# Patient Record
Sex: FEMALE | Race: BLACK OR AFRICAN AMERICAN | URBAN - METROPOLITAN AREA
[De-identification: names, ages, dates, MRNs, and addresses within clinical notes are randomized per-mention and may not be internally consistent; named-entity substitution may affect disease eponyms.]

---

## 2020-06-01 ENCOUNTER — OFFICE VISIT (OUTPATIENT)
Dept: URBAN - METROPOLITAN AREA SURGERY CENTER 16 | Facility: SURGERY CENTER | Age: 40
End: 2020-06-01

## 2020-06-19 ENCOUNTER — OFFICE VISIT (OUTPATIENT)
Dept: URBAN - METROPOLITAN AREA CLINIC 91 | Facility: CLINIC | Age: 40
End: 2020-06-19
Payer: COMMERCIAL

## 2020-06-19 DIAGNOSIS — K51.80 OTHER ULCERATIVE COLITIS WITHOUT COMPLICATION: ICD-10-CM

## 2020-06-19 PROCEDURE — 96415 CHEMO IV INFUSION ADDL HR: CPT | Performed by: INTERNAL MEDICINE

## 2020-06-19 PROCEDURE — 96413 CHEMO IV INFUSION 1 HR: CPT | Performed by: INTERNAL MEDICINE

## 2020-06-19 PROCEDURE — 96375 TX/PRO/DX INJ NEW DRUG ADDON: CPT | Performed by: INTERNAL MEDICINE

## 2020-06-19 RX ORDER — GABAPENTIN 300 MG/1
TAKE 1 CAPSULE BY ORAL ROUTE ONCE A DAY (AT BEDTIME) CAPSULE ORAL 1
Qty: 0 | Refills: 0 | Status: ACTIVE | COMMUNITY
Start: 1900-01-01 | End: 1900-01-01

## 2020-06-24 ENCOUNTER — OFFICE VISIT (OUTPATIENT)
Dept: URBAN - METROPOLITAN AREA CLINIC 105 | Facility: CLINIC | Age: 40
End: 2020-06-24

## 2020-07-13 ENCOUNTER — OFFICE VISIT (OUTPATIENT)
Dept: URBAN - METROPOLITAN AREA SURGERY CENTER 16 | Facility: SURGERY CENTER | Age: 40
End: 2020-07-13
Payer: COMMERCIAL

## 2020-07-13 DIAGNOSIS — K51.80 CHRONIC PANCOLONIC ULCERATIVE COLITIS: ICD-10-CM

## 2020-07-13 PROCEDURE — G9937 DIG OR SURV COLSCO: HCPCS | Performed by: INTERNAL MEDICINE

## 2020-07-13 PROCEDURE — G8907 PT DOC NO EVENTS ON DISCHARG: HCPCS | Performed by: INTERNAL MEDICINE

## 2020-07-13 PROCEDURE — 45380 COLONOSCOPY AND BIOPSY: CPT | Performed by: INTERNAL MEDICINE

## 2020-07-13 RX ORDER — GABAPENTIN 300 MG/1
TAKE 1 CAPSULE BY ORAL ROUTE ONCE A DAY (AT BEDTIME) CAPSULE ORAL 1
Qty: 0 | Refills: 0 | Status: ACTIVE | COMMUNITY
Start: 1900-01-01 | End: 1900-01-01

## 2020-07-17 ENCOUNTER — OFFICE VISIT (OUTPATIENT)
Dept: URBAN - METROPOLITAN AREA CLINIC 97 | Facility: CLINIC | Age: 40
End: 2020-07-17
Payer: COMMERCIAL

## 2020-07-17 VITALS
TEMPERATURE: 98.6 F | SYSTOLIC BLOOD PRESSURE: 115 MMHG | DIASTOLIC BLOOD PRESSURE: 70 MMHG | BODY MASS INDEX: 29.19 KG/M2 | WEIGHT: 186 LBS | HEIGHT: 67 IN | RESPIRATION RATE: 16 BRPM

## 2020-07-17 DIAGNOSIS — K51.80 ULCERATIVE (CHRONIC) ILEOCOLITIS: ICD-10-CM

## 2020-07-17 PROCEDURE — 96415 CHEMO IV INFUSION ADDL HR: CPT | Performed by: INTERNAL MEDICINE

## 2020-07-17 PROCEDURE — 96413 CHEMO IV INFUSION 1 HR: CPT | Performed by: INTERNAL MEDICINE

## 2020-07-17 PROCEDURE — 96375 TX/PRO/DX INJ NEW DRUG ADDON: CPT | Performed by: INTERNAL MEDICINE

## 2020-07-17 RX ORDER — GABAPENTIN 300 MG/1
TAKE 1 CAPSULE BY ORAL ROUTE ONCE A DAY (AT BEDTIME) CAPSULE ORAL 1
Qty: 0 | Refills: 0 | Status: ACTIVE | COMMUNITY
Start: 1900-01-01 | End: 1900-01-01

## 2020-07-24 ENCOUNTER — OFFICE VISIT (OUTPATIENT)
Dept: URBAN - METROPOLITAN AREA CLINIC 97 | Facility: CLINIC | Age: 40
End: 2020-07-24

## 2020-07-31 ENCOUNTER — OFFICE VISIT (OUTPATIENT)
Dept: URBAN - METROPOLITAN AREA CLINIC 97 | Facility: CLINIC | Age: 40
End: 2020-07-31

## 2020-08-07 ENCOUNTER — OFFICE VISIT (OUTPATIENT)
Dept: URBAN - METROPOLITAN AREA CLINIC 97 | Facility: CLINIC | Age: 40
End: 2020-08-07

## 2020-08-14 ENCOUNTER — OFFICE VISIT (OUTPATIENT)
Dept: URBAN - METROPOLITAN AREA CLINIC 97 | Facility: CLINIC | Age: 40
End: 2020-08-14

## 2020-08-17 ENCOUNTER — OFFICE VISIT (OUTPATIENT)
Dept: URBAN - METROPOLITAN AREA CLINIC 97 | Facility: CLINIC | Age: 40
End: 2020-08-17

## 2020-08-17 ENCOUNTER — OFFICE VISIT (OUTPATIENT)
Dept: URBAN - METROPOLITAN AREA CLINIC 97 | Facility: CLINIC | Age: 40
End: 2020-08-17
Payer: COMMERCIAL

## 2020-08-17 DIAGNOSIS — K51.80 CHRONIC PANCOLONIC ULCERATIVE COLITIS: ICD-10-CM

## 2020-08-17 PROCEDURE — 96375 TX/PRO/DX INJ NEW DRUG ADDON: CPT | Performed by: INTERNAL MEDICINE

## 2020-08-17 PROCEDURE — 96413 CHEMO IV INFUSION 1 HR: CPT | Performed by: INTERNAL MEDICINE

## 2020-08-17 PROCEDURE — 96415 CHEMO IV INFUSION ADDL HR: CPT | Performed by: INTERNAL MEDICINE

## 2020-08-17 RX ORDER — GABAPENTIN 300 MG/1
TAKE 1 CAPSULE BY ORAL ROUTE ONCE A DAY (AT BEDTIME) CAPSULE ORAL 1
Qty: 0 | Refills: 0 | Status: ACTIVE | COMMUNITY
Start: 1900-01-01 | End: 1900-01-01

## 2020-08-21 ENCOUNTER — OFFICE VISIT (OUTPATIENT)
Dept: URBAN - METROPOLITAN AREA CLINIC 97 | Facility: CLINIC | Age: 40
End: 2020-08-21

## 2020-08-28 ENCOUNTER — OFFICE VISIT (OUTPATIENT)
Dept: URBAN - METROPOLITAN AREA CLINIC 97 | Facility: CLINIC | Age: 40
End: 2020-08-28

## 2020-09-04 ENCOUNTER — OFFICE VISIT (OUTPATIENT)
Dept: URBAN - METROPOLITAN AREA CLINIC 97 | Facility: CLINIC | Age: 40
End: 2020-09-04

## 2020-09-11 ENCOUNTER — OFFICE VISIT (OUTPATIENT)
Dept: URBAN - METROPOLITAN AREA CLINIC 97 | Facility: CLINIC | Age: 40
End: 2020-09-11

## 2020-09-16 ENCOUNTER — OFFICE VISIT (OUTPATIENT)
Dept: URBAN - METROPOLITAN AREA CLINIC 91 | Facility: CLINIC | Age: 40
End: 2020-09-16

## 2020-09-18 ENCOUNTER — OFFICE VISIT (OUTPATIENT)
Dept: URBAN - METROPOLITAN AREA CLINIC 97 | Facility: CLINIC | Age: 40
End: 2020-09-18

## 2020-09-25 ENCOUNTER — OFFICE VISIT (OUTPATIENT)
Dept: URBAN - METROPOLITAN AREA CLINIC 91 | Facility: CLINIC | Age: 40
End: 2020-09-25
Payer: COMMERCIAL

## 2020-09-25 VITALS
DIASTOLIC BLOOD PRESSURE: 82 MMHG | WEIGHT: 190.8 LBS | HEART RATE: 90 BPM | HEIGHT: 67 IN | RESPIRATION RATE: 18 BRPM | TEMPERATURE: 97.8 F | BODY MASS INDEX: 29.95 KG/M2 | SYSTOLIC BLOOD PRESSURE: 123 MMHG

## 2020-09-25 DIAGNOSIS — K51.80 CHRONIC PANCOLONIC ULCERATIVE COLITIS: ICD-10-CM

## 2020-09-25 PROCEDURE — 96415 CHEMO IV INFUSION ADDL HR: CPT | Performed by: INTERNAL MEDICINE

## 2020-09-25 PROCEDURE — 96375 TX/PRO/DX INJ NEW DRUG ADDON: CPT | Performed by: INTERNAL MEDICINE

## 2020-09-25 PROCEDURE — 96413 CHEMO IV INFUSION 1 HR: CPT | Performed by: INTERNAL MEDICINE

## 2020-09-25 RX ORDER — GABAPENTIN 300 MG/1
TAKE 1 CAPSULE BY ORAL ROUTE ONCE A DAY (AT BEDTIME) CAPSULE ORAL 1
Qty: 0 | Refills: 0 | Status: ACTIVE | COMMUNITY
Start: 1900-01-01 | End: 1900-01-01

## 2020-10-21 ENCOUNTER — TELEPHONE ENCOUNTER (OUTPATIENT)
Dept: URBAN - METROPOLITAN AREA CLINIC 6 | Facility: CLINIC | Age: 40
End: 2020-10-21

## 2020-10-21 ENCOUNTER — TELEPHONE ENCOUNTER (OUTPATIENT)
Dept: URBAN - METROPOLITAN AREA CLINIC 92 | Facility: CLINIC | Age: 40
End: 2020-10-21

## 2020-10-21 RX ORDER — HYDROCORTISONE SODIUM SUCCINATE 100 MG/2ML
200 MG INJECTION, POWDER, FOR SOLUTION INTRAMUSCULAR; INTRAVENOUS
Refills: 11 | OUTPATIENT
Start: 2020-10-21 | End: 2021-09-22

## 2020-10-21 RX ORDER — INFLIXIMAB 100 MG/10ML
900 MG INJECTION, POWDER, LYOPHILIZED, FOR SOLUTION INTRAVENOUS
Qty: 9 VIALS | Refills: 11 | OUTPATIENT
Start: 2020-10-21 | End: 2021-09-22

## 2020-10-21 RX ORDER — DIPHENHYDRAMINE HYDROCHLORIDE 50 MG/ML
25 MG INJECTION INTRAMUSCULAR; INTRAVENOUS
Qty: 1 VIAL | Refills: 11 | OUTPATIENT
Start: 2020-10-21

## 2020-10-21 RX ORDER — GABAPENTIN 300 MG/1
TAKE 1 CAPSULE BY ORAL ROUTE ONCE A DAY (AT BEDTIME) CAPSULE ORAL 1
Qty: 0 | Refills: 0 | Status: ACTIVE | COMMUNITY
Start: 1900-01-01 | End: 1900-01-01

## 2020-10-23 ENCOUNTER — OFFICE VISIT (OUTPATIENT)
Dept: URBAN - METROPOLITAN AREA CLINIC 91 | Facility: CLINIC | Age: 40
End: 2020-10-23
Payer: COMMERCIAL

## 2020-10-23 VITALS
HEIGHT: 67 IN | DIASTOLIC BLOOD PRESSURE: 52 MMHG | WEIGHT: 189.2 LBS | BODY MASS INDEX: 29.7 KG/M2 | HEART RATE: 79 BPM | RESPIRATION RATE: 16 BRPM | TEMPERATURE: 97.5 F | SYSTOLIC BLOOD PRESSURE: 146 MMHG

## 2020-10-23 DIAGNOSIS — K51.80 CHRONIC PANCOLONIC ULCERATIVE COLITIS: ICD-10-CM

## 2020-10-23 PROCEDURE — 96375 TX/PRO/DX INJ NEW DRUG ADDON: CPT | Performed by: INTERNAL MEDICINE

## 2020-10-23 PROCEDURE — 96415 CHEMO IV INFUSION ADDL HR: CPT | Performed by: INTERNAL MEDICINE

## 2020-10-23 PROCEDURE — 96413 CHEMO IV INFUSION 1 HR: CPT | Performed by: INTERNAL MEDICINE

## 2020-10-23 RX ORDER — HYDROCORTISONE SODIUM SUCCINATE 100 MG/2ML
200 MG INJECTION, POWDER, FOR SOLUTION INTRAMUSCULAR; INTRAVENOUS
Refills: 11 | Status: ACTIVE | COMMUNITY
Start: 2020-10-21 | End: 2021-09-22

## 2020-10-23 RX ORDER — DIPHENHYDRAMINE HYDROCHLORIDE 50 MG/ML
25 MG INJECTION INTRAMUSCULAR; INTRAVENOUS
Qty: 1 VIAL | Refills: 11 | Status: ACTIVE | COMMUNITY
Start: 2020-10-21

## 2020-10-23 RX ORDER — GABAPENTIN 300 MG/1
TAKE 1 CAPSULE BY ORAL ROUTE ONCE A DAY (AT BEDTIME) CAPSULE ORAL 1
Qty: 0 | Refills: 0 | Status: ACTIVE | COMMUNITY
Start: 1900-01-01 | End: 1900-01-01

## 2020-10-23 RX ORDER — INFLIXIMAB 100 MG/10ML
900 MG INJECTION, POWDER, LYOPHILIZED, FOR SOLUTION INTRAVENOUS
Qty: 9 VIALS | Refills: 11 | Status: ACTIVE | COMMUNITY
Start: 2020-10-21 | End: 2021-09-22

## 2020-10-30 ENCOUNTER — ERX REFILL RESPONSE (OUTPATIENT)
Age: 40
End: 2020-10-30

## 2020-10-30 RX ORDER — LINACLOTIDE 145 UG/1
TAKE ONE CAPSULE BY MOUTH EVERY DAY ON AN EMPTY STOMACH AT LEAST 30 MINUTES BEFORE 1ST MEAL OF THE DAY CAPSULE, GELATIN COATED ORAL
Qty: 30 | Refills: 3

## 2020-11-24 ENCOUNTER — OFFICE VISIT (OUTPATIENT)
Dept: URBAN - METROPOLITAN AREA CLINIC 97 | Facility: CLINIC | Age: 40
End: 2020-11-24
Payer: COMMERCIAL

## 2020-11-24 VITALS
WEIGHT: 189 LBS | RESPIRATION RATE: 17 BRPM | DIASTOLIC BLOOD PRESSURE: 74 MMHG | TEMPERATURE: 97.4 F | HEIGHT: 67 IN | SYSTOLIC BLOOD PRESSURE: 103 MMHG | BODY MASS INDEX: 29.66 KG/M2 | HEART RATE: 66 BPM

## 2020-11-24 DIAGNOSIS — K51.80 CHRONIC PANCOLONIC ULCERATIVE COLITIS: ICD-10-CM

## 2020-11-24 PROCEDURE — 96415 CHEMO IV INFUSION ADDL HR: CPT | Performed by: INTERNAL MEDICINE

## 2020-11-24 PROCEDURE — 96413 CHEMO IV INFUSION 1 HR: CPT | Performed by: INTERNAL MEDICINE

## 2020-11-24 PROCEDURE — 96375 TX/PRO/DX INJ NEW DRUG ADDON: CPT | Performed by: INTERNAL MEDICINE

## 2020-11-24 RX ORDER — GABAPENTIN 300 MG/1
TAKE 1 CAPSULE BY ORAL ROUTE ONCE A DAY (AT BEDTIME) CAPSULE ORAL 1
Qty: 0 | Refills: 0 | Status: ACTIVE | COMMUNITY
Start: 1900-01-01 | End: 1900-01-01

## 2020-11-24 RX ORDER — DIPHENHYDRAMINE HYDROCHLORIDE 50 MG/ML
25 MG INJECTION INTRAMUSCULAR; INTRAVENOUS
Qty: 1 VIAL | Refills: 11 | Status: ACTIVE | COMMUNITY
Start: 2020-10-21

## 2020-11-24 RX ORDER — INFLIXIMAB 100 MG/10ML
900 MG INJECTION, POWDER, LYOPHILIZED, FOR SOLUTION INTRAVENOUS
Qty: 9 VIALS | Refills: 11 | Status: ACTIVE | COMMUNITY
Start: 2020-10-21 | End: 2021-09-22

## 2020-11-24 RX ORDER — LINACLOTIDE 145 UG/1
TAKE ONE CAPSULE BY MOUTH EVERY DAY ON AN EMPTY STOMACH AT LEAST 30 MINUTES BEFORE 1ST MEAL OF THE DAY CAPSULE, GELATIN COATED ORAL
Qty: 30 | Refills: 3 | Status: ACTIVE | COMMUNITY

## 2020-11-24 RX ORDER — HYDROCORTISONE SODIUM SUCCINATE 100 MG/2ML
200 MG INJECTION, POWDER, FOR SOLUTION INTRAMUSCULAR; INTRAVENOUS
Refills: 11 | Status: ACTIVE | COMMUNITY
Start: 2020-10-21 | End: 2021-09-22

## 2020-12-17 ENCOUNTER — TELEPHONE ENCOUNTER (OUTPATIENT)
Dept: URBAN - METROPOLITAN AREA CLINIC 96 | Facility: CLINIC | Age: 40
End: 2020-12-17

## 2020-12-23 ENCOUNTER — OFFICE VISIT (OUTPATIENT)
Dept: URBAN - METROPOLITAN AREA CLINIC 39 | Facility: CLINIC | Age: 40
End: 2020-12-23
Payer: COMMERCIAL

## 2020-12-23 VITALS
BODY MASS INDEX: 29.03 KG/M2 | RESPIRATION RATE: 18 BRPM | TEMPERATURE: 97.9 F | SYSTOLIC BLOOD PRESSURE: 107 MMHG | WEIGHT: 185 LBS | DIASTOLIC BLOOD PRESSURE: 65 MMHG | HEIGHT: 67 IN

## 2020-12-23 DIAGNOSIS — K51.80 CHRONIC PANCOLONIC ULCERATIVE COLITIS: ICD-10-CM

## 2020-12-23 PROCEDURE — 96415 CHEMO IV INFUSION ADDL HR: CPT | Performed by: INTERNAL MEDICINE

## 2020-12-23 PROCEDURE — 96375 TX/PRO/DX INJ NEW DRUG ADDON: CPT | Performed by: INTERNAL MEDICINE

## 2020-12-23 PROCEDURE — 96413 CHEMO IV INFUSION 1 HR: CPT | Performed by: INTERNAL MEDICINE

## 2020-12-23 RX ORDER — LINACLOTIDE 145 UG/1
TAKE ONE CAPSULE BY MOUTH EVERY DAY ON AN EMPTY STOMACH AT LEAST 30 MINUTES BEFORE 1ST MEAL OF THE DAY CAPSULE, GELATIN COATED ORAL
Qty: 30 | Refills: 3 | Status: ACTIVE | COMMUNITY

## 2020-12-23 RX ORDER — GABAPENTIN 300 MG/1
TAKE 1 CAPSULE BY ORAL ROUTE ONCE A DAY (AT BEDTIME) CAPSULE ORAL 1
Qty: 0 | Refills: 0 | Status: ACTIVE | COMMUNITY
Start: 1900-01-01 | End: 1900-01-01

## 2020-12-23 RX ORDER — DIPHENHYDRAMINE HYDROCHLORIDE 50 MG/ML
25 MG INJECTION INTRAMUSCULAR; INTRAVENOUS
Qty: 1 VIAL | Refills: 11 | Status: ACTIVE | COMMUNITY
Start: 2020-10-21

## 2020-12-23 RX ORDER — INFLIXIMAB 100 MG/10ML
900 MG INJECTION, POWDER, LYOPHILIZED, FOR SOLUTION INTRAVENOUS
Qty: 9 VIALS | Refills: 11 | Status: ACTIVE | COMMUNITY
Start: 2020-10-21 | End: 2021-09-22

## 2020-12-23 RX ORDER — HYDROCORTISONE SODIUM SUCCINATE 100 MG/2ML
200 MG INJECTION, POWDER, FOR SOLUTION INTRAMUSCULAR; INTRAVENOUS
Refills: 11 | Status: ACTIVE | COMMUNITY
Start: 2020-10-21 | End: 2021-09-22

## 2020-12-29 ENCOUNTER — OFFICE VISIT (OUTPATIENT)
Dept: URBAN - METROPOLITAN AREA CLINIC 97 | Facility: CLINIC | Age: 40
End: 2020-12-29

## 2021-01-29 ENCOUNTER — OFFICE VISIT (OUTPATIENT)
Dept: URBAN - METROPOLITAN AREA CLINIC 91 | Facility: CLINIC | Age: 41
End: 2021-01-29
Payer: COMMERCIAL

## 2021-01-29 VITALS
RESPIRATION RATE: 19 BRPM | TEMPERATURE: 97.5 F | WEIGHT: 185 LBS | DIASTOLIC BLOOD PRESSURE: 71 MMHG | SYSTOLIC BLOOD PRESSURE: 110 MMHG | HEART RATE: 76 BPM | HEIGHT: 67 IN | BODY MASS INDEX: 29.03 KG/M2

## 2021-01-29 DIAGNOSIS — K51.80 CHRONIC PANCOLONIC ULCERATIVE COLITIS: ICD-10-CM

## 2021-01-29 PROCEDURE — 96413 CHEMO IV INFUSION 1 HR: CPT | Performed by: INTERNAL MEDICINE

## 2021-01-29 PROCEDURE — 96415 CHEMO IV INFUSION ADDL HR: CPT | Performed by: INTERNAL MEDICINE

## 2021-01-29 PROCEDURE — 96375 TX/PRO/DX INJ NEW DRUG ADDON: CPT | Performed by: INTERNAL MEDICINE

## 2021-01-29 RX ORDER — GABAPENTIN 300 MG/1
TAKE 1 CAPSULE BY ORAL ROUTE ONCE A DAY (AT BEDTIME) CAPSULE ORAL 1
Qty: 0 | Refills: 0 | Status: ACTIVE | COMMUNITY
Start: 1900-01-01 | End: 1900-01-01

## 2021-01-29 RX ORDER — HYDROCORTISONE SODIUM SUCCINATE 100 MG/2ML
200 MG INJECTION, POWDER, FOR SOLUTION INTRAMUSCULAR; INTRAVENOUS
Refills: 11 | Status: ACTIVE | COMMUNITY
Start: 2020-10-21 | End: 2021-09-22

## 2021-01-29 RX ORDER — DIPHENHYDRAMINE HYDROCHLORIDE 50 MG/ML
25 MG INJECTION INTRAMUSCULAR; INTRAVENOUS
Qty: 1 VIAL | Refills: 11 | Status: ACTIVE | COMMUNITY
Start: 2020-10-21

## 2021-01-29 RX ORDER — INFLIXIMAB 100 MG/10ML
900 MG INJECTION, POWDER, LYOPHILIZED, FOR SOLUTION INTRAVENOUS
Qty: 9 VIALS | Refills: 11 | Status: ACTIVE | COMMUNITY
Start: 2020-10-21 | End: 2021-09-22

## 2021-01-29 RX ORDER — LINACLOTIDE 145 UG/1
TAKE ONE CAPSULE BY MOUTH EVERY DAY ON AN EMPTY STOMACH AT LEAST 30 MINUTES BEFORE 1ST MEAL OF THE DAY CAPSULE, GELATIN COATED ORAL
Qty: 30 | Refills: 3 | Status: ACTIVE | COMMUNITY

## 2021-02-15 ENCOUNTER — OFFICE VISIT (OUTPATIENT)
Dept: URBAN - METROPOLITAN AREA CLINIC 97 | Facility: CLINIC | Age: 41
End: 2021-02-15

## 2021-02-26 ENCOUNTER — OFFICE VISIT (OUTPATIENT)
Dept: URBAN - METROPOLITAN AREA CLINIC 97 | Facility: CLINIC | Age: 41
End: 2021-02-26
Payer: COMMERCIAL

## 2021-02-26 VITALS
RESPIRATION RATE: 18 BRPM | WEIGHT: 185 LBS | HEART RATE: 82 BPM | SYSTOLIC BLOOD PRESSURE: 127 MMHG | TEMPERATURE: 97.9 F | DIASTOLIC BLOOD PRESSURE: 76 MMHG | BODY MASS INDEX: 29.03 KG/M2 | HEIGHT: 67 IN

## 2021-02-26 DIAGNOSIS — K51.80 CHRONIC PANCOLONIC ULCERATIVE COLITIS: ICD-10-CM

## 2021-02-26 PROCEDURE — 96375 TX/PRO/DX INJ NEW DRUG ADDON: CPT | Performed by: INTERNAL MEDICINE

## 2021-02-26 PROCEDURE — 96415 CHEMO IV INFUSION ADDL HR: CPT | Performed by: INTERNAL MEDICINE

## 2021-02-26 PROCEDURE — 96413 CHEMO IV INFUSION 1 HR: CPT | Performed by: INTERNAL MEDICINE

## 2021-02-26 RX ORDER — LINACLOTIDE 145 UG/1
TAKE ONE CAPSULE BY MOUTH EVERY DAY ON AN EMPTY STOMACH AT LEAST 30 MINUTES BEFORE 1ST MEAL OF THE DAY CAPSULE, GELATIN COATED ORAL
Qty: 30 | Refills: 3 | Status: ACTIVE | COMMUNITY

## 2021-02-26 RX ORDER — DIPHENHYDRAMINE HYDROCHLORIDE 50 MG/ML
25 MG INJECTION INTRAMUSCULAR; INTRAVENOUS
Qty: 1 VIAL | Refills: 11 | Status: ACTIVE | COMMUNITY
Start: 2020-10-21

## 2021-02-26 RX ORDER — HYDROCORTISONE SODIUM SUCCINATE 100 MG/2ML
200 MG INJECTION, POWDER, FOR SOLUTION INTRAMUSCULAR; INTRAVENOUS
Refills: 11 | Status: ACTIVE | COMMUNITY
Start: 2020-10-21 | End: 2021-09-22

## 2021-02-26 RX ORDER — GABAPENTIN 300 MG/1
TAKE 1 CAPSULE BY ORAL ROUTE ONCE A DAY (AT BEDTIME) CAPSULE ORAL 1
Qty: 0 | Refills: 0 | Status: ACTIVE | COMMUNITY
Start: 1900-01-01 | End: 1900-01-01

## 2021-02-26 RX ORDER — INFLIXIMAB 100 MG/10ML
900 MG INJECTION, POWDER, LYOPHILIZED, FOR SOLUTION INTRAVENOUS
Qty: 9 VIALS | Refills: 11 | Status: ACTIVE | COMMUNITY
Start: 2020-10-21 | End: 2021-09-22

## 2021-04-05 ENCOUNTER — OFFICE VISIT (OUTPATIENT)
Dept: URBAN - METROPOLITAN AREA CLINIC 97 | Facility: CLINIC | Age: 41
End: 2021-04-05
Payer: COMMERCIAL

## 2021-04-05 ENCOUNTER — OFFICE VISIT (OUTPATIENT)
Dept: URBAN - METROPOLITAN AREA CLINIC 97 | Facility: CLINIC | Age: 41
End: 2021-04-05

## 2021-04-05 DIAGNOSIS — K51.80 CHRONIC PANCOLONIC ULCERATIVE COLITIS: ICD-10-CM

## 2021-04-05 PROCEDURE — 96413 CHEMO IV INFUSION 1 HR: CPT | Performed by: INTERNAL MEDICINE

## 2021-04-05 PROCEDURE — 96375 TX/PRO/DX INJ NEW DRUG ADDON: CPT | Performed by: INTERNAL MEDICINE

## 2021-04-05 PROCEDURE — 96415 CHEMO IV INFUSION ADDL HR: CPT | Performed by: INTERNAL MEDICINE

## 2021-04-05 RX ORDER — LINACLOTIDE 145 UG/1
TAKE ONE CAPSULE BY MOUTH EVERY DAY ON AN EMPTY STOMACH AT LEAST 30 MINUTES BEFORE 1ST MEAL OF THE DAY CAPSULE, GELATIN COATED ORAL
Qty: 30 | Refills: 3 | Status: ACTIVE | COMMUNITY

## 2021-04-05 RX ORDER — INFLIXIMAB 100 MG/10ML
900 MG INJECTION, POWDER, LYOPHILIZED, FOR SOLUTION INTRAVENOUS
Qty: 9 VIALS | Refills: 11 | Status: ACTIVE | COMMUNITY
Start: 2020-10-21 | End: 2021-09-22

## 2021-04-05 RX ORDER — DIPHENHYDRAMINE HYDROCHLORIDE 50 MG/ML
25 MG INJECTION INTRAMUSCULAR; INTRAVENOUS
Qty: 1 VIAL | Refills: 11 | Status: ACTIVE | COMMUNITY
Start: 2020-10-21

## 2021-04-05 RX ORDER — HYDROCORTISONE SODIUM SUCCINATE 100 MG/2ML
200 MG INJECTION, POWDER, FOR SOLUTION INTRAMUSCULAR; INTRAVENOUS
Refills: 11 | Status: ACTIVE | COMMUNITY
Start: 2020-10-21 | End: 2021-09-22

## 2021-04-05 RX ORDER — GABAPENTIN 300 MG/1
TAKE 1 CAPSULE BY ORAL ROUTE ONCE A DAY (AT BEDTIME) CAPSULE ORAL 1
Qty: 0 | Refills: 0 | Status: ACTIVE | COMMUNITY
Start: 1900-01-01 | End: 1900-01-01

## 2021-05-07 ENCOUNTER — OFFICE VISIT (OUTPATIENT)
Dept: URBAN - METROPOLITAN AREA CLINIC 97 | Facility: CLINIC | Age: 41
End: 2021-05-07
Payer: COMMERCIAL

## 2021-05-07 VITALS
WEIGHT: 188 LBS | TEMPERATURE: 96.4 F | RESPIRATION RATE: 16 BRPM | BODY MASS INDEX: 29.51 KG/M2 | HEIGHT: 67 IN | SYSTOLIC BLOOD PRESSURE: 121 MMHG | DIASTOLIC BLOOD PRESSURE: 72 MMHG | HEART RATE: 71 BPM

## 2021-05-07 DIAGNOSIS — K51.80 CHRONIC PANCOLONIC ULCERATIVE COLITIS: ICD-10-CM

## 2021-05-07 PROCEDURE — 96415 CHEMO IV INFUSION ADDL HR: CPT | Performed by: INTERNAL MEDICINE

## 2021-05-07 PROCEDURE — 96375 TX/PRO/DX INJ NEW DRUG ADDON: CPT | Performed by: INTERNAL MEDICINE

## 2021-05-07 PROCEDURE — 96413 CHEMO IV INFUSION 1 HR: CPT | Performed by: INTERNAL MEDICINE

## 2021-05-07 RX ORDER — HYDROCORTISONE SODIUM SUCCINATE 100 MG/2ML
200 MG INJECTION, POWDER, FOR SOLUTION INTRAMUSCULAR; INTRAVENOUS
Refills: 11 | Status: ACTIVE | COMMUNITY
Start: 2020-10-21 | End: 2021-09-22

## 2021-05-07 RX ORDER — INFLIXIMAB 100 MG/10ML
900 MG INJECTION, POWDER, LYOPHILIZED, FOR SOLUTION INTRAVENOUS
Qty: 9 VIALS | Refills: 11 | Status: ACTIVE | COMMUNITY
Start: 2020-10-21 | End: 2021-09-22

## 2021-05-07 RX ORDER — LINACLOTIDE 145 UG/1
TAKE ONE CAPSULE BY MOUTH EVERY DAY ON AN EMPTY STOMACH AT LEAST 30 MINUTES BEFORE 1ST MEAL OF THE DAY CAPSULE, GELATIN COATED ORAL
Qty: 30 | Refills: 3 | Status: ACTIVE | COMMUNITY

## 2021-05-07 RX ORDER — GABAPENTIN 300 MG/1
TAKE 1 CAPSULE BY ORAL ROUTE ONCE A DAY (AT BEDTIME) CAPSULE ORAL 1
Qty: 0 | Refills: 0 | Status: ACTIVE | COMMUNITY
Start: 1900-01-01 | End: 1900-01-01

## 2021-05-07 RX ORDER — DIPHENHYDRAMINE HYDROCHLORIDE 50 MG/ML
25 MG INJECTION INTRAMUSCULAR; INTRAVENOUS
Qty: 1 VIAL | Refills: 11 | Status: ACTIVE | COMMUNITY
Start: 2020-10-21

## 2021-06-09 ENCOUNTER — OFFICE VISIT (OUTPATIENT)
Dept: URBAN - METROPOLITAN AREA CLINIC 91 | Facility: CLINIC | Age: 41
End: 2021-06-09
Payer: COMMERCIAL

## 2021-06-09 VITALS
SYSTOLIC BLOOD PRESSURE: 112 MMHG | TEMPERATURE: 98.1 F | DIASTOLIC BLOOD PRESSURE: 80 MMHG | BODY MASS INDEX: 30.48 KG/M2 | WEIGHT: 194.2 LBS | HEIGHT: 67 IN | RESPIRATION RATE: 16 BRPM | HEART RATE: 69 BPM

## 2021-06-09 DIAGNOSIS — K51.80 CHRONIC PANCOLONIC ULCERATIVE COLITIS: ICD-10-CM

## 2021-06-09 PROCEDURE — 96415 CHEMO IV INFUSION ADDL HR: CPT | Performed by: INTERNAL MEDICINE

## 2021-06-09 PROCEDURE — 96413 CHEMO IV INFUSION 1 HR: CPT | Performed by: INTERNAL MEDICINE

## 2021-06-09 PROCEDURE — 96375 TX/PRO/DX INJ NEW DRUG ADDON: CPT | Performed by: INTERNAL MEDICINE

## 2021-06-09 RX ORDER — LINACLOTIDE 145 UG/1
TAKE ONE CAPSULE BY MOUTH EVERY DAY ON AN EMPTY STOMACH AT LEAST 30 MINUTES BEFORE 1ST MEAL OF THE DAY CAPSULE, GELATIN COATED ORAL
Qty: 30 | Refills: 3 | Status: ACTIVE | COMMUNITY

## 2021-06-09 RX ORDER — HYDROCORTISONE SODIUM SUCCINATE 100 MG/2ML
200 MG INJECTION, POWDER, FOR SOLUTION INTRAMUSCULAR; INTRAVENOUS
Refills: 11 | Status: ACTIVE | COMMUNITY
Start: 2020-10-21 | End: 2021-09-22

## 2021-06-09 RX ORDER — DIPHENHYDRAMINE HYDROCHLORIDE 50 MG/ML
25 MG INJECTION INTRAMUSCULAR; INTRAVENOUS
Qty: 1 VIAL | Refills: 11 | Status: ACTIVE | COMMUNITY
Start: 2020-10-21

## 2021-06-09 RX ORDER — GABAPENTIN 300 MG/1
TAKE 1 CAPSULE BY ORAL ROUTE ONCE A DAY (AT BEDTIME) CAPSULE ORAL 1
Qty: 0 | Refills: 0 | Status: ACTIVE | COMMUNITY
Start: 1900-01-01 | End: 1900-01-01

## 2021-06-09 RX ORDER — INFLIXIMAB 100 MG/10ML
900 MG INJECTION, POWDER, LYOPHILIZED, FOR SOLUTION INTRAVENOUS
Qty: 9 VIALS | Refills: 11 | Status: ACTIVE | COMMUNITY
Start: 2020-10-21 | End: 2021-09-22

## 2021-07-08 ENCOUNTER — OFFICE VISIT (OUTPATIENT)
Dept: URBAN - METROPOLITAN AREA CLINIC 97 | Facility: CLINIC | Age: 41
End: 2021-07-08
Payer: COMMERCIAL

## 2021-07-08 VITALS
HEART RATE: 77 BPM | RESPIRATION RATE: 16 BRPM | BODY MASS INDEX: 30.45 KG/M2 | DIASTOLIC BLOOD PRESSURE: 74 MMHG | HEIGHT: 67 IN | WEIGHT: 194 LBS | SYSTOLIC BLOOD PRESSURE: 122 MMHG | TEMPERATURE: 97 F

## 2021-07-08 DIAGNOSIS — K51.80 CHRONIC PANCOLONIC ULCERATIVE COLITIS: ICD-10-CM

## 2021-07-08 PROCEDURE — 96375 TX/PRO/DX INJ NEW DRUG ADDON: CPT | Performed by: INTERNAL MEDICINE

## 2021-07-08 PROCEDURE — 96415 CHEMO IV INFUSION ADDL HR: CPT | Performed by: INTERNAL MEDICINE

## 2021-07-08 PROCEDURE — 96413 CHEMO IV INFUSION 1 HR: CPT | Performed by: INTERNAL MEDICINE

## 2021-07-08 RX ORDER — DIPHENHYDRAMINE HYDROCHLORIDE 50 MG/ML
25 MG INJECTION INTRAMUSCULAR; INTRAVENOUS
Qty: 1 VIAL | Refills: 11 | Status: ACTIVE | COMMUNITY
Start: 2020-10-21

## 2021-07-08 RX ORDER — GABAPENTIN 300 MG/1
TAKE 1 CAPSULE BY ORAL ROUTE ONCE A DAY (AT BEDTIME) CAPSULE ORAL 1
Qty: 0 | Refills: 0 | Status: ACTIVE | COMMUNITY
Start: 1900-01-01 | End: 1900-01-01

## 2021-07-08 RX ORDER — LINACLOTIDE 145 UG/1
TAKE ONE CAPSULE BY MOUTH EVERY DAY ON AN EMPTY STOMACH AT LEAST 30 MINUTES BEFORE 1ST MEAL OF THE DAY CAPSULE, GELATIN COATED ORAL
Qty: 30 | Refills: 3 | Status: ACTIVE | COMMUNITY

## 2021-07-08 RX ORDER — INFLIXIMAB 100 MG/10ML
900 MG INJECTION, POWDER, LYOPHILIZED, FOR SOLUTION INTRAVENOUS
Qty: 9 VIALS | Refills: 11 | Status: ACTIVE | COMMUNITY
Start: 2020-10-21 | End: 2021-09-22

## 2021-07-08 RX ORDER — HYDROCORTISONE SODIUM SUCCINATE 100 MG/2ML
200 MG INJECTION, POWDER, FOR SOLUTION INTRAMUSCULAR; INTRAVENOUS
Refills: 11 | Status: ACTIVE | COMMUNITY
Start: 2020-10-21 | End: 2021-09-22

## 2021-07-30 ENCOUNTER — OFFICE VISIT (OUTPATIENT)
Dept: URBAN - METROPOLITAN AREA CLINIC 97 | Facility: CLINIC | Age: 41
End: 2021-07-30
Payer: COMMERCIAL

## 2021-07-30 VITALS
RESPIRATION RATE: 18 BRPM | BODY MASS INDEX: 29.82 KG/M2 | HEART RATE: 82 BPM | WEIGHT: 190 LBS | TEMPERATURE: 97.7 F | HEIGHT: 67 IN | SYSTOLIC BLOOD PRESSURE: 159 MMHG | DIASTOLIC BLOOD PRESSURE: 83 MMHG

## 2021-07-30 DIAGNOSIS — K51.80 CHRONIC PANCOLONIC ULCERATIVE COLITIS: ICD-10-CM

## 2021-07-30 PROCEDURE — 96375 TX/PRO/DX INJ NEW DRUG ADDON: CPT | Performed by: INTERNAL MEDICINE

## 2021-07-30 PROCEDURE — 96413 CHEMO IV INFUSION 1 HR: CPT | Performed by: INTERNAL MEDICINE

## 2021-07-30 PROCEDURE — 96415 CHEMO IV INFUSION ADDL HR: CPT | Performed by: INTERNAL MEDICINE

## 2021-07-30 RX ORDER — HYDROCORTISONE SODIUM SUCCINATE 100 MG/2ML
200 MG INJECTION, POWDER, FOR SOLUTION INTRAMUSCULAR; INTRAVENOUS
Refills: 11 | Status: ACTIVE | COMMUNITY
Start: 2020-10-21 | End: 2021-09-22

## 2021-07-30 RX ORDER — LINACLOTIDE 145 UG/1
TAKE ONE CAPSULE BY MOUTH EVERY DAY ON AN EMPTY STOMACH AT LEAST 30 MINUTES BEFORE 1ST MEAL OF THE DAY CAPSULE, GELATIN COATED ORAL
Qty: 30 | Refills: 3 | Status: ACTIVE | COMMUNITY

## 2021-07-30 RX ORDER — GABAPENTIN 300 MG/1
TAKE 1 CAPSULE BY ORAL ROUTE ONCE A DAY (AT BEDTIME) CAPSULE ORAL 1
Qty: 0 | Refills: 0 | Status: ACTIVE | COMMUNITY
Start: 1900-01-01 | End: 1900-01-01

## 2021-07-30 RX ORDER — INFLIXIMAB 100 MG/10ML
900 MG INJECTION, POWDER, LYOPHILIZED, FOR SOLUTION INTRAVENOUS
Qty: 9 VIALS | Refills: 11 | Status: ACTIVE | COMMUNITY
Start: 2020-10-21 | End: 2021-09-22

## 2021-07-30 RX ORDER — DIPHENHYDRAMINE HYDROCHLORIDE 50 MG/ML
25 MG INJECTION INTRAMUSCULAR; INTRAVENOUS
Qty: 1 VIAL | Refills: 11 | Status: ACTIVE | COMMUNITY
Start: 2020-10-21

## 2021-08-14 ENCOUNTER — TELEPHONE ENCOUNTER (OUTPATIENT)
Dept: URBAN - METROPOLITAN AREA CLINIC 92 | Facility: CLINIC | Age: 41
End: 2021-08-14

## 2021-08-14 RX ORDER — HYOSCYAMINE SULFATE 0.12 MG/1
1 TABLET UNDER THE TONGUE AND ALLOW TO DISSOLVE  AS NEEDED TABLET SUBLINGUAL
Qty: 30 | Refills: 0 | OUTPATIENT
Start: 2021-08-14 | End: 2021-09-13

## 2021-08-19 ENCOUNTER — TELEPHONE ENCOUNTER (OUTPATIENT)
Dept: URBAN - METROPOLITAN AREA SURGERY CENTER 30 | Facility: SURGERY CENTER | Age: 41
End: 2021-08-19

## 2021-08-24 ENCOUNTER — TELEPHONE ENCOUNTER (OUTPATIENT)
Dept: URBAN - METROPOLITAN AREA CLINIC 18 | Facility: CLINIC | Age: 41
End: 2021-08-24

## 2021-08-27 ENCOUNTER — OFFICE VISIT (OUTPATIENT)
Dept: URBAN - METROPOLITAN AREA CLINIC 97 | Facility: CLINIC | Age: 41
End: 2021-08-27
Payer: COMMERCIAL

## 2021-08-27 VITALS
BODY MASS INDEX: 29.51 KG/M2 | TEMPERATURE: 97 F | HEIGHT: 67 IN | DIASTOLIC BLOOD PRESSURE: 66 MMHG | HEART RATE: 88 BPM | RESPIRATION RATE: 20 BRPM | WEIGHT: 188 LBS | SYSTOLIC BLOOD PRESSURE: 117 MMHG

## 2021-08-27 DIAGNOSIS — K51.80 CHRONIC PANCOLONIC ULCERATIVE COLITIS: ICD-10-CM

## 2021-08-27 PROCEDURE — 96375 TX/PRO/DX INJ NEW DRUG ADDON: CPT | Performed by: INTERNAL MEDICINE

## 2021-08-27 PROCEDURE — 96413 CHEMO IV INFUSION 1 HR: CPT | Performed by: INTERNAL MEDICINE

## 2021-08-27 PROCEDURE — 96415 CHEMO IV INFUSION ADDL HR: CPT | Performed by: INTERNAL MEDICINE

## 2021-08-27 RX ORDER — LINACLOTIDE 145 UG/1
TAKE ONE CAPSULE BY MOUTH EVERY DAY ON AN EMPTY STOMACH AT LEAST 30 MINUTES BEFORE 1ST MEAL OF THE DAY CAPSULE, GELATIN COATED ORAL
Qty: 30 | Refills: 3 | Status: ACTIVE | COMMUNITY

## 2021-08-27 RX ORDER — INFLIXIMAB 100 MG/10ML
900 MG INJECTION, POWDER, LYOPHILIZED, FOR SOLUTION INTRAVENOUS
Qty: 9 VIALS | Refills: 11 | Status: ACTIVE | COMMUNITY
Start: 2020-10-21 | End: 2021-09-22

## 2021-08-27 RX ORDER — HYOSCYAMINE SULFATE 0.12 MG/1
1 TABLET UNDER THE TONGUE AND ALLOW TO DISSOLVE  AS NEEDED TABLET SUBLINGUAL
Qty: 30 | Refills: 0 | Status: ACTIVE | COMMUNITY
Start: 2021-08-14 | End: 2021-09-13

## 2021-08-27 RX ORDER — GABAPENTIN 300 MG/1
TAKE 1 CAPSULE BY ORAL ROUTE ONCE A DAY (AT BEDTIME) CAPSULE ORAL 1
Qty: 0 | Refills: 0 | Status: ACTIVE | COMMUNITY
Start: 1900-01-01 | End: 1900-01-01

## 2021-08-27 RX ORDER — HYDROCORTISONE SODIUM SUCCINATE 100 MG/2ML
200 MG INJECTION, POWDER, FOR SOLUTION INTRAMUSCULAR; INTRAVENOUS
Refills: 11 | Status: ACTIVE | COMMUNITY
Start: 2020-10-21 | End: 2021-09-22

## 2021-08-27 RX ORDER — DIPHENHYDRAMINE HYDROCHLORIDE 50 MG/ML
25 MG INJECTION INTRAMUSCULAR; INTRAVENOUS
Qty: 1 VIAL | Refills: 11 | Status: ACTIVE | COMMUNITY
Start: 2020-10-21

## 2021-09-24 ENCOUNTER — OFFICE VISIT (OUTPATIENT)
Dept: URBAN - METROPOLITAN AREA CLINIC 97 | Facility: CLINIC | Age: 41
End: 2021-09-24
Payer: COMMERCIAL

## 2021-09-24 VITALS
RESPIRATION RATE: 18 BRPM | DIASTOLIC BLOOD PRESSURE: 83 MMHG | HEIGHT: 67 IN | SYSTOLIC BLOOD PRESSURE: 121 MMHG | BODY MASS INDEX: 29.82 KG/M2 | HEART RATE: 85 BPM | WEIGHT: 190 LBS | TEMPERATURE: 96.7 F

## 2021-09-24 DIAGNOSIS — K51.80 CHRONIC PANCOLONIC ULCERATIVE COLITIS: ICD-10-CM

## 2021-09-24 PROCEDURE — 96375 TX/PRO/DX INJ NEW DRUG ADDON: CPT | Performed by: INTERNAL MEDICINE

## 2021-09-24 PROCEDURE — 96413 CHEMO IV INFUSION 1 HR: CPT | Performed by: INTERNAL MEDICINE

## 2021-09-24 PROCEDURE — 96415 CHEMO IV INFUSION ADDL HR: CPT | Performed by: INTERNAL MEDICINE

## 2021-09-24 RX ORDER — DIPHENHYDRAMINE HYDROCHLORIDE 50 MG/ML
25 MG INJECTION INTRAMUSCULAR; INTRAVENOUS
Qty: 1 VIAL | Refills: 11 | Status: ACTIVE | COMMUNITY
Start: 2020-10-21

## 2021-09-24 RX ORDER — GABAPENTIN 300 MG/1
TAKE 1 CAPSULE BY ORAL ROUTE ONCE A DAY (AT BEDTIME) CAPSULE ORAL 1
Qty: 0 | Refills: 0 | Status: ACTIVE | COMMUNITY
Start: 1900-01-01 | End: 1900-01-01

## 2021-09-24 RX ORDER — LINACLOTIDE 145 UG/1
TAKE ONE CAPSULE BY MOUTH EVERY DAY ON AN EMPTY STOMACH AT LEAST 30 MINUTES BEFORE 1ST MEAL OF THE DAY CAPSULE, GELATIN COATED ORAL
Qty: 30 | Refills: 3 | Status: ACTIVE | COMMUNITY

## 2021-10-22 ENCOUNTER — OFFICE VISIT (OUTPATIENT)
Dept: URBAN - METROPOLITAN AREA CLINIC 97 | Facility: CLINIC | Age: 41
End: 2021-10-22
Payer: COMMERCIAL

## 2021-10-22 VITALS
DIASTOLIC BLOOD PRESSURE: 72 MMHG | BODY MASS INDEX: 30.29 KG/M2 | TEMPERATURE: 96.7 F | WEIGHT: 193 LBS | HEART RATE: 80 BPM | HEIGHT: 67 IN | RESPIRATION RATE: 18 BRPM | SYSTOLIC BLOOD PRESSURE: 97 MMHG

## 2021-10-22 DIAGNOSIS — K51.80 CHRONIC PANCOLONIC ULCERATIVE COLITIS: ICD-10-CM

## 2021-10-22 PROCEDURE — 96375 TX/PRO/DX INJ NEW DRUG ADDON: CPT | Performed by: INTERNAL MEDICINE

## 2021-10-22 PROCEDURE — 96413 CHEMO IV INFUSION 1 HR: CPT | Performed by: INTERNAL MEDICINE

## 2021-10-22 PROCEDURE — 96415 CHEMO IV INFUSION ADDL HR: CPT | Performed by: INTERNAL MEDICINE

## 2021-10-22 RX ORDER — GABAPENTIN 300 MG/1
TAKE 1 CAPSULE BY ORAL ROUTE ONCE A DAY (AT BEDTIME) CAPSULE ORAL 1
Qty: 0 | Refills: 0 | Status: ACTIVE | COMMUNITY
Start: 1900-01-01 | End: 1900-01-01

## 2021-10-22 RX ORDER — DIPHENHYDRAMINE HYDROCHLORIDE 50 MG/ML
25 MG INJECTION INTRAMUSCULAR; INTRAVENOUS
Qty: 1 VIAL | Refills: 11 | Status: ACTIVE | COMMUNITY
Start: 2020-10-21

## 2021-10-22 RX ORDER — LINACLOTIDE 145 UG/1
TAKE ONE CAPSULE BY MOUTH EVERY DAY ON AN EMPTY STOMACH AT LEAST 30 MINUTES BEFORE 1ST MEAL OF THE DAY CAPSULE, GELATIN COATED ORAL
Qty: 30 | Refills: 3 | Status: ACTIVE | COMMUNITY

## 2021-11-19 ENCOUNTER — OFFICE VISIT (OUTPATIENT)
Dept: URBAN - METROPOLITAN AREA CLINIC 97 | Facility: CLINIC | Age: 41
End: 2021-11-19
Payer: COMMERCIAL

## 2021-11-19 ENCOUNTER — TELEPHONE ENCOUNTER (OUTPATIENT)
Dept: URBAN - METROPOLITAN AREA CLINIC 18 | Facility: CLINIC | Age: 41
End: 2021-11-19

## 2021-11-19 ENCOUNTER — OFFICE VISIT (OUTPATIENT)
Dept: URBAN - METROPOLITAN AREA TELEHEALTH 2 | Facility: TELEHEALTH | Age: 41
End: 2021-11-19
Payer: COMMERCIAL

## 2021-11-19 VITALS — BODY MASS INDEX: 30.29 KG/M2 | HEIGHT: 67 IN | WEIGHT: 193 LBS

## 2021-11-19 DIAGNOSIS — K51.80 CHRONIC PANCOLONIC ULCERATIVE COLITIS: ICD-10-CM

## 2021-11-19 DIAGNOSIS — D50.8 ACQUIRED IRON DEFICIENCY ANEMIA DUE TO DECREASED ABSORPTION: ICD-10-CM

## 2021-11-19 DIAGNOSIS — R19.7 DIARRHEA, UNSPECIFIED: ICD-10-CM

## 2021-11-19 DIAGNOSIS — R11.0 NAUSEA: ICD-10-CM

## 2021-11-19 DIAGNOSIS — K59.00 CONSTIPATION, UNSPECIFIED: ICD-10-CM

## 2021-11-19 DIAGNOSIS — E55.9 VITAMIN D DEFICIENCY: ICD-10-CM

## 2021-11-19 DIAGNOSIS — K51.919 ULCERATIVE COLITIS WITH COMPLICATION, UNSPECIFIED LOCATION: ICD-10-CM

## 2021-11-19 PROCEDURE — 96375 TX/PRO/DX INJ NEW DRUG ADDON: CPT | Performed by: INTERNAL MEDICINE

## 2021-11-19 PROCEDURE — 96413 CHEMO IV INFUSION 1 HR: CPT | Performed by: INTERNAL MEDICINE

## 2021-11-19 PROCEDURE — 99214 OFFICE O/P EST MOD 30 MIN: CPT | Performed by: INTERNAL MEDICINE

## 2021-11-19 PROCEDURE — 96415 CHEMO IV INFUSION ADDL HR: CPT | Performed by: INTERNAL MEDICINE

## 2021-11-19 RX ORDER — HYOSCYAMINE SULFATE 0.12 MG/1
1 TABLET TABLET SUBLINGUAL
Qty: 90 | Refills: 3
Start: 2021-08-14 | End: 2022-11-14

## 2021-11-19 RX ORDER — GABAPENTIN 300 MG/1
TAKE 1 CAPSULE BY ORAL ROUTE ONCE A DAY (AT BEDTIME) CAPSULE ORAL 1
Qty: 0 | Refills: 0 | Status: ACTIVE | COMMUNITY
Start: 1900-01-01

## 2021-11-19 RX ORDER — LINACLOTIDE 145 UG/1
TAKE ONE CAPSULE BY MOUTH EVERY DAY ON AN EMPTY STOMACH AT LEAST 30 MINUTES BEFORE 1ST MEAL OF THE DAY CAPSULE, GELATIN COATED ORAL
Qty: 30 | Refills: 3 | Status: ACTIVE | COMMUNITY

## 2021-11-19 RX ORDER — HYDROCORTISONE SODIUM SUCCINATE 100 MG/2ML
200 MG INJECTION, POWDER, FOR SOLUTION INTRAMUSCULAR; INTRAVENOUS
OUTPATIENT
Start: 2020-10-21

## 2021-11-19 RX ORDER — LINACLOTIDE 145 UG/1
TAKE ONE CAPSULE BY MOUTH EVERY DAY ON AN EMPTY STOMACH AT LEAST 30 MINUTES BEFORE 1ST MEAL OF THE DAY CAPSULE, GELATIN COATED ORAL
Qty: 90 | Refills: 3

## 2021-11-19 RX ORDER — DIPHENHYDRAMINE HYDROCHLORIDE 50 MG/ML
25 MG INJECTION INTRAMUSCULAR; INTRAVENOUS
Qty: 1 VIAL | Refills: 11 | Status: ACTIVE | COMMUNITY
Start: 2020-10-21

## 2021-11-19 RX ORDER — INFLIXIMAB 100 MG/10ML
900 MG INJECTION, POWDER, LYOPHILIZED, FOR SOLUTION INTRAVENOUS
OUTPATIENT
Start: 2020-10-21

## 2021-11-19 RX ORDER — ONDANSETRON 2 MG/ML
4 MG INJECTION, SOLUTION INTRAMUSCULAR; INTRAVENOUS
Qty: 4 MG | Refills: 11 | OUTPATIENT
Start: 2021-11-19

## 2021-11-19 RX ORDER — DIPHENHYDRAMINE HYDROCHLORIDE 50 MG/ML
25 MG INJECTION INTRAMUSCULAR; INTRAVENOUS
OUTPATIENT
Start: 2020-10-21
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
## 2021-12-21 ENCOUNTER — TELEPHONE ENCOUNTER (OUTPATIENT)
Dept: URBAN - METROPOLITAN AREA CLINIC 92 | Facility: CLINIC | Age: 41
End: 2021-12-21

## 2021-12-21 RX ORDER — ONDANSETRON HYDROCHLORIDE 4 MG/1
4 MG TABLET, FILM COATED ORAL
Qty: 4 MG | Refills: 11 | OUTPATIENT
Start: 2021-12-21 | End: 2022-01-02

## 2021-12-21 RX ORDER — DIPHENHYDRAMINE HYDROCHLORIDE 50 MG/ML
25 MG INJECTION INTRAMUSCULAR; INTRAVENOUS
Qty: 25 MG | Refills: 11 | OUTPATIENT
Start: 2021-12-21

## 2021-12-21 RX ORDER — INFLIXIMAB 100 MG/10ML
AS DIRECTED INJECTION, POWDER, LYOPHILIZED, FOR SOLUTION INTRAVENOUS
Qty: 100 MILLIGRAMS | Refills: 11 | OUTPATIENT
Start: 2021-12-21 | End: 2022-12-16

## 2021-12-21 RX ORDER — HYDROCORTISONE SODIUM SUCCINATE 100 MG/2ML
200 MG INJECTION, POWDER, FOR SOLUTION INTRAMUSCULAR; INTRAVENOUS
Qty: 200 MG | Refills: 11 | OUTPATIENT
Start: 2021-12-21 | End: 2022-01-02

## 2021-12-22 ENCOUNTER — OFFICE VISIT (OUTPATIENT)
Dept: URBAN - METROPOLITAN AREA CLINIC 97 | Facility: CLINIC | Age: 41
End: 2021-12-22
Payer: COMMERCIAL

## 2021-12-22 DIAGNOSIS — K51.80 CHRONIC PANCOLONIC ULCERATIVE COLITIS: ICD-10-CM

## 2021-12-22 PROCEDURE — 96413 CHEMO IV INFUSION 1 HR: CPT | Performed by: INTERNAL MEDICINE

## 2021-12-22 PROCEDURE — 96415 CHEMO IV INFUSION ADDL HR: CPT | Performed by: INTERNAL MEDICINE

## 2021-12-22 PROCEDURE — 96375 TX/PRO/DX INJ NEW DRUG ADDON: CPT | Performed by: INTERNAL MEDICINE

## 2021-12-22 RX ORDER — LINACLOTIDE 145 UG/1
TAKE ONE CAPSULE BY MOUTH EVERY DAY ON AN EMPTY STOMACH AT LEAST 30 MINUTES BEFORE 1ST MEAL OF THE DAY CAPSULE, GELATIN COATED ORAL
Qty: 30 | Refills: 3 | Status: ACTIVE | COMMUNITY

## 2021-12-22 RX ORDER — INFLIXIMAB 100 MG/10ML
900 MG INJECTION, POWDER, LYOPHILIZED, FOR SOLUTION INTRAVENOUS
Status: ACTIVE | COMMUNITY
Start: 2020-10-21

## 2021-12-22 RX ORDER — DIPHENHYDRAMINE HYDROCHLORIDE 50 MG/ML
25 MG INJECTION INTRAMUSCULAR; INTRAVENOUS
Qty: 25 MG | Refills: 11 | Status: ACTIVE | COMMUNITY
Start: 2021-12-21

## 2021-12-22 RX ORDER — GABAPENTIN 300 MG/1
TAKE 1 CAPSULE BY ORAL ROUTE ONCE A DAY (AT BEDTIME) CAPSULE ORAL 1
Qty: 0 | Refills: 0 | Status: ACTIVE | COMMUNITY
Start: 1900-01-01

## 2021-12-22 RX ORDER — INFLIXIMAB 100 MG/10ML
AS DIRECTED INJECTION, POWDER, LYOPHILIZED, FOR SOLUTION INTRAVENOUS
Qty: 100 MILLIGRAMS | Refills: 11 | Status: ACTIVE | COMMUNITY
Start: 2021-12-21 | End: 2022-12-16

## 2021-12-22 RX ORDER — ONDANSETRON HYDROCHLORIDE 4 MG/1
4 MG TABLET, FILM COATED ORAL
Qty: 4 MG | Refills: 11 | Status: ACTIVE | COMMUNITY
Start: 2021-12-21 | End: 2022-01-02

## 2021-12-22 RX ORDER — HYDROCORTISONE SODIUM SUCCINATE 100 MG/2ML
200 MG INJECTION, POWDER, FOR SOLUTION INTRAMUSCULAR; INTRAVENOUS
Qty: 200 MG | Refills: 11 | Status: ACTIVE | COMMUNITY
Start: 2021-12-21 | End: 2022-01-02

## 2021-12-22 RX ORDER — DIPHENHYDRAMINE HYDROCHLORIDE 50 MG/ML
25 MG INJECTION INTRAMUSCULAR; INTRAVENOUS
Status: ACTIVE | COMMUNITY
Start: 2020-10-21

## 2021-12-22 RX ORDER — ONDANSETRON 2 MG/ML
4 MG INJECTION, SOLUTION INTRAMUSCULAR; INTRAVENOUS
Qty: 4 MG | Refills: 11 | Status: ACTIVE | COMMUNITY
Start: 2021-11-19

## 2021-12-22 RX ORDER — HYDROCORTISONE SODIUM SUCCINATE 100 MG/2ML
200 MG INJECTION, POWDER, FOR SOLUTION INTRAMUSCULAR; INTRAVENOUS
Status: ACTIVE | COMMUNITY
Start: 2020-10-21

## 2021-12-22 RX ORDER — HYOSCYAMINE SULFATE 0.12 MG/1
1 TABLET TABLET SUBLINGUAL
Qty: 90 | Refills: 3 | Status: ACTIVE | COMMUNITY
Start: 2021-08-14 | End: 2022-11-14

## 2021-12-23 LAB
A/G RATIO: 1.4
ALBUMIN: 4.6
ALKALINE PHOSPHATASE: 87
ALT (SGPT): 25
AST (SGOT): 24
BASO (ABSOLUTE): 0.1
BASOS: 1
BILIRUBIN, TOTAL: 0.5
BUN/CREATININE RATIO: 12
BUN: 11
CALCIUM: 9.8
CARBON DIOXIDE, TOTAL: 23
CHLORIDE: 104
CREATININE: 0.94
EGFR IF AFRICN AM: 88
EGFR IF NONAFRICN AM: 76
EOS (ABSOLUTE): 0.1
EOS: 1
GLOBULIN, TOTAL: 3.2
GLUCOSE: 86
HBSAG SCREEN: NEGATIVE
HEMATOCRIT: 35.9
HEMATOLOGY COMMENTS:: (no result)
HEMOGLOBIN: 12
HEP B CORE AB, IGM: NEGATIVE
HEP B CORE AB, TOT: NEGATIVE
HEP B SURFACE AB, QUAL: NON REACTIVE
IMMATURE CELLS: (no result)
IMMATURE GRANS (ABS): 0
IMMATURE GRANULOCYTES: 0
LYMPHS (ABSOLUTE): 4
LYMPHS: 37
MCH: 30.2
MCHC: 33.4
MCV: 90
MONOCYTES(ABSOLUTE): 0.5
MONOCYTES: 5
NEUTROPHILS (ABSOLUTE): 5.9
NEUTROPHILS: 56
NRBC: (no result)
PLATELETS: 247
POTASSIUM: 3.9
PROTEIN, TOTAL: 7.8
QUANTIFERON CRITERIA: (no result)
QUANTIFERON INCUBATION: (no result)
QUANTIFERON MITOGEN VALUE: >10
QUANTIFERON NIL VALUE: 0.06
QUANTIFERON TB1 AG VALUE: 0.08
QUANTIFERON TB2 AG VALUE: 0.07
QUANTIFERON-TB GOLD PLUS: NEGATIVE
RBC: 3.97
RDW: 11.8
SODIUM: 140
VITAMIN D, 25-HYDROXY: 23.6
WBC: 10.6

## 2022-01-21 ENCOUNTER — OFFICE VISIT (OUTPATIENT)
Dept: URBAN - METROPOLITAN AREA CLINIC 97 | Facility: CLINIC | Age: 42
End: 2022-01-21
Payer: COMMERCIAL

## 2022-01-21 DIAGNOSIS — K51.80 CHRONIC PANCOLONIC ULCERATIVE COLITIS: ICD-10-CM

## 2022-01-21 PROCEDURE — 96413 CHEMO IV INFUSION 1 HR: CPT | Performed by: INTERNAL MEDICINE

## 2022-01-21 PROCEDURE — 96415 CHEMO IV INFUSION ADDL HR: CPT | Performed by: INTERNAL MEDICINE

## 2022-01-21 PROCEDURE — 96375 TX/PRO/DX INJ NEW DRUG ADDON: CPT | Performed by: INTERNAL MEDICINE

## 2022-01-21 RX ORDER — DIPHENHYDRAMINE HYDROCHLORIDE 50 MG/ML
25 MG INJECTION INTRAMUSCULAR; INTRAVENOUS
Qty: 25 MG | Refills: 11 | Status: ACTIVE | COMMUNITY
Start: 2021-12-21

## 2022-01-21 RX ORDER — HYDROCORTISONE SODIUM SUCCINATE 100 MG/2ML
200 MG INJECTION, POWDER, FOR SOLUTION INTRAMUSCULAR; INTRAVENOUS
Status: ACTIVE | COMMUNITY
Start: 2020-10-21

## 2022-01-21 RX ORDER — HYOSCYAMINE SULFATE 0.12 MG/1
1 TABLET TABLET SUBLINGUAL
Qty: 90 | Refills: 3 | Status: ACTIVE | COMMUNITY
Start: 2021-08-14 | End: 2022-11-14

## 2022-01-21 RX ORDER — GABAPENTIN 300 MG/1
TAKE 1 CAPSULE BY ORAL ROUTE ONCE A DAY (AT BEDTIME) CAPSULE ORAL 1
Qty: 0 | Refills: 0 | Status: ACTIVE | COMMUNITY
Start: 1900-01-01

## 2022-01-21 RX ORDER — ONDANSETRON 2 MG/ML
4 MG INJECTION, SOLUTION INTRAMUSCULAR; INTRAVENOUS
Qty: 4 MG | Refills: 11 | Status: ACTIVE | COMMUNITY
Start: 2021-11-19

## 2022-01-21 RX ORDER — INFLIXIMAB 100 MG/10ML
AS DIRECTED INJECTION, POWDER, LYOPHILIZED, FOR SOLUTION INTRAVENOUS
Qty: 100 MILLIGRAMS | Refills: 11 | Status: ACTIVE | COMMUNITY
Start: 2021-12-21 | End: 2022-12-16

## 2022-01-21 RX ORDER — INFLIXIMAB 100 MG/10ML
900 MG INJECTION, POWDER, LYOPHILIZED, FOR SOLUTION INTRAVENOUS
Status: ACTIVE | COMMUNITY
Start: 2020-10-21

## 2022-01-21 RX ORDER — LINACLOTIDE 145 UG/1
TAKE ONE CAPSULE BY MOUTH EVERY DAY ON AN EMPTY STOMACH AT LEAST 30 MINUTES BEFORE 1ST MEAL OF THE DAY CAPSULE, GELATIN COATED ORAL
Qty: 30 | Refills: 3 | Status: ACTIVE | COMMUNITY

## 2022-01-21 RX ORDER — DIPHENHYDRAMINE HYDROCHLORIDE 50 MG/ML
25 MG INJECTION INTRAMUSCULAR; INTRAVENOUS
Status: ACTIVE | COMMUNITY
Start: 2020-10-21

## 2022-02-25 ENCOUNTER — OFFICE VISIT (OUTPATIENT)
Dept: URBAN - METROPOLITAN AREA CLINIC 97 | Facility: CLINIC | Age: 42
End: 2022-02-25
Payer: COMMERCIAL

## 2022-02-25 DIAGNOSIS — K51.80 CHRONIC PANCOLONIC ULCERATIVE COLITIS: ICD-10-CM

## 2022-02-25 PROCEDURE — 96415 CHEMO IV INFUSION ADDL HR: CPT | Performed by: INTERNAL MEDICINE

## 2022-02-25 PROCEDURE — 96375 TX/PRO/DX INJ NEW DRUG ADDON: CPT | Performed by: INTERNAL MEDICINE

## 2022-02-25 PROCEDURE — 96413 CHEMO IV INFUSION 1 HR: CPT | Performed by: INTERNAL MEDICINE

## 2022-02-25 RX ORDER — HYOSCYAMINE SULFATE 0.12 MG/1
1 TABLET TABLET SUBLINGUAL
Qty: 90 | Refills: 3 | Status: ACTIVE | COMMUNITY
Start: 2021-08-14 | End: 2022-11-14

## 2022-02-25 RX ORDER — LINACLOTIDE 145 UG/1
TAKE ONE CAPSULE BY MOUTH EVERY DAY ON AN EMPTY STOMACH AT LEAST 30 MINUTES BEFORE 1ST MEAL OF THE DAY CAPSULE, GELATIN COATED ORAL
Qty: 30 | Refills: 3 | Status: ACTIVE | COMMUNITY

## 2022-02-25 RX ORDER — HYDROCORTISONE SODIUM SUCCINATE 100 MG/2ML
200 MG INJECTION, POWDER, FOR SOLUTION INTRAMUSCULAR; INTRAVENOUS
Status: ACTIVE | COMMUNITY
Start: 2020-10-21

## 2022-02-25 RX ORDER — INFLIXIMAB 100 MG/10ML
AS DIRECTED INJECTION, POWDER, LYOPHILIZED, FOR SOLUTION INTRAVENOUS
Qty: 100 MILLIGRAMS | Refills: 11 | Status: ACTIVE | COMMUNITY
Start: 2021-12-21 | End: 2022-12-16

## 2022-02-25 RX ORDER — DIPHENHYDRAMINE HYDROCHLORIDE 50 MG/ML
25 MG INJECTION INTRAMUSCULAR; INTRAVENOUS
Qty: 25 MG | Refills: 11 | Status: ACTIVE | COMMUNITY
Start: 2021-12-21

## 2022-02-25 RX ORDER — DIPHENHYDRAMINE HYDROCHLORIDE 50 MG/ML
25 MG INJECTION INTRAMUSCULAR; INTRAVENOUS
Status: ACTIVE | COMMUNITY
Start: 2020-10-21

## 2022-02-25 RX ORDER — ONDANSETRON 2 MG/ML
4 MG INJECTION, SOLUTION INTRAMUSCULAR; INTRAVENOUS
Qty: 4 MG | Refills: 11 | Status: ACTIVE | COMMUNITY
Start: 2021-11-19

## 2022-02-25 RX ORDER — GABAPENTIN 300 MG/1
TAKE 1 CAPSULE BY ORAL ROUTE ONCE A DAY (AT BEDTIME) CAPSULE ORAL 1
Qty: 0 | Refills: 0 | Status: ACTIVE | COMMUNITY
Start: 1900-01-01

## 2022-02-25 RX ORDER — INFLIXIMAB 100 MG/10ML
900 MG INJECTION, POWDER, LYOPHILIZED, FOR SOLUTION INTRAVENOUS
Status: ACTIVE | COMMUNITY
Start: 2020-10-21

## 2022-03-25 ENCOUNTER — TELEPHONE ENCOUNTER (OUTPATIENT)
Dept: URBAN - METROPOLITAN AREA CLINIC 18 | Facility: CLINIC | Age: 42
End: 2022-03-25

## 2022-03-25 ENCOUNTER — OFFICE VISIT (OUTPATIENT)
Dept: URBAN - METROPOLITAN AREA CLINIC 97 | Facility: CLINIC | Age: 42
End: 2022-03-25
Payer: COMMERCIAL

## 2022-03-25 DIAGNOSIS — K51.80 CHRONIC PANCOLONIC ULCERATIVE COLITIS: ICD-10-CM

## 2022-03-25 PROCEDURE — 96375 TX/PRO/DX INJ NEW DRUG ADDON: CPT | Performed by: INTERNAL MEDICINE

## 2022-03-25 PROCEDURE — 96415 CHEMO IV INFUSION ADDL HR: CPT | Performed by: INTERNAL MEDICINE

## 2022-03-25 PROCEDURE — 96413 CHEMO IV INFUSION 1 HR: CPT | Performed by: INTERNAL MEDICINE

## 2022-03-25 RX ORDER — DIPHENHYDRAMINE HYDROCHLORIDE 50 MG/ML
25 MG INJECTION INTRAMUSCULAR; INTRAVENOUS
Status: ACTIVE | COMMUNITY
Start: 2020-10-21

## 2022-03-25 RX ORDER — HYOSCYAMINE SULFATE 0.12 MG/1
1 TABLET TABLET SUBLINGUAL
Qty: 90 | Refills: 3 | Status: ACTIVE | COMMUNITY
Start: 2021-08-14 | End: 2022-11-14

## 2022-03-25 RX ORDER — LINACLOTIDE 145 UG/1
TAKE ONE CAPSULE BY MOUTH EVERY DAY ON AN EMPTY STOMACH AT LEAST 30 MINUTES BEFORE 1ST MEAL OF THE DAY CAPSULE, GELATIN COATED ORAL
Qty: 30 | Refills: 3 | Status: ACTIVE | COMMUNITY

## 2022-03-25 RX ORDER — ONDANSETRON 2 MG/ML
4 MG INJECTION, SOLUTION INTRAMUSCULAR; INTRAVENOUS
Qty: 4 MG | Refills: 11 | Status: ACTIVE | COMMUNITY
Start: 2021-11-19

## 2022-03-25 RX ORDER — INFLIXIMAB 100 MG/10ML
AS DIRECTED INJECTION, POWDER, LYOPHILIZED, FOR SOLUTION INTRAVENOUS
Qty: 100 MILLIGRAMS | Refills: 11 | Status: ACTIVE | COMMUNITY
Start: 2021-12-21 | End: 2022-12-16

## 2022-03-25 RX ORDER — DIPHENHYDRAMINE HYDROCHLORIDE 50 MG/ML
25 MG INJECTION INTRAMUSCULAR; INTRAVENOUS
Qty: 25 MG | Refills: 11 | Status: ACTIVE | COMMUNITY
Start: 2021-12-21

## 2022-03-25 RX ORDER — INFLIXIMAB 100 MG/10ML
900 MG INJECTION, POWDER, LYOPHILIZED, FOR SOLUTION INTRAVENOUS
Status: ACTIVE | COMMUNITY
Start: 2020-10-21

## 2022-03-25 RX ORDER — HYDROCORTISONE SODIUM SUCCINATE 100 MG/2ML
200 MG INJECTION, POWDER, FOR SOLUTION INTRAMUSCULAR; INTRAVENOUS
Status: ACTIVE | COMMUNITY
Start: 2020-10-21

## 2022-03-25 RX ORDER — GABAPENTIN 300 MG/1
TAKE 1 CAPSULE BY ORAL ROUTE ONCE A DAY (AT BEDTIME) CAPSULE ORAL 1
Qty: 0 | Refills: 0 | Status: ACTIVE | COMMUNITY
Start: 1900-01-01

## 2022-04-25 ENCOUNTER — OFFICE VISIT (OUTPATIENT)
Dept: URBAN - METROPOLITAN AREA CLINIC 97 | Facility: CLINIC | Age: 42
End: 2022-04-25
Payer: COMMERCIAL

## 2022-04-25 VITALS
TEMPERATURE: 96.8 F | HEIGHT: 67 IN | DIASTOLIC BLOOD PRESSURE: 76 MMHG | BODY MASS INDEX: 31.71 KG/M2 | SYSTOLIC BLOOD PRESSURE: 134 MMHG | WEIGHT: 202 LBS | RESPIRATION RATE: 16 BRPM | HEART RATE: 84 BPM

## 2022-04-25 DIAGNOSIS — K51.80 CHRONIC PANCOLONIC ULCERATIVE COLITIS: ICD-10-CM

## 2022-04-25 PROCEDURE — 96415 CHEMO IV INFUSION ADDL HR: CPT | Performed by: INTERNAL MEDICINE

## 2022-04-25 PROCEDURE — 96413 CHEMO IV INFUSION 1 HR: CPT | Performed by: INTERNAL MEDICINE

## 2022-04-25 PROCEDURE — 96375 TX/PRO/DX INJ NEW DRUG ADDON: CPT | Performed by: INTERNAL MEDICINE

## 2022-04-25 RX ORDER — GABAPENTIN 300 MG/1
TAKE 1 CAPSULE BY ORAL ROUTE ONCE A DAY (AT BEDTIME) CAPSULE ORAL 1
Qty: 0 | Refills: 0 | Status: ACTIVE | COMMUNITY
Start: 1900-01-01

## 2022-04-25 RX ORDER — DIPHENHYDRAMINE HYDROCHLORIDE 50 MG/ML
25 MG INJECTION INTRAMUSCULAR; INTRAVENOUS
Status: ACTIVE | COMMUNITY
Start: 2020-10-21

## 2022-04-25 RX ORDER — ONDANSETRON 2 MG/ML
4 MG INJECTION, SOLUTION INTRAMUSCULAR; INTRAVENOUS
Qty: 4 MG | Refills: 11 | Status: ACTIVE | COMMUNITY
Start: 2021-11-19

## 2022-04-25 RX ORDER — INFLIXIMAB 100 MG/10ML
AS DIRECTED INJECTION, POWDER, LYOPHILIZED, FOR SOLUTION INTRAVENOUS
Qty: 100 MILLIGRAMS | Refills: 11 | Status: ACTIVE | COMMUNITY
Start: 2021-12-21 | End: 2022-12-16

## 2022-04-25 RX ORDER — INFLIXIMAB 100 MG/10ML
900 MG INJECTION, POWDER, LYOPHILIZED, FOR SOLUTION INTRAVENOUS
Status: ACTIVE | COMMUNITY
Start: 2020-10-21

## 2022-04-25 RX ORDER — LINACLOTIDE 145 UG/1
TAKE ONE CAPSULE BY MOUTH EVERY DAY ON AN EMPTY STOMACH AT LEAST 30 MINUTES BEFORE 1ST MEAL OF THE DAY CAPSULE, GELATIN COATED ORAL
Qty: 30 | Refills: 3 | Status: ACTIVE | COMMUNITY

## 2022-04-25 RX ORDER — HYOSCYAMINE SULFATE 0.12 MG/1
1 TABLET TABLET SUBLINGUAL
Qty: 90 | Refills: 3 | Status: ACTIVE | COMMUNITY
Start: 2021-08-14 | End: 2022-11-14

## 2022-04-25 RX ORDER — HYDROCORTISONE SODIUM SUCCINATE 100 MG/2ML
200 MG INJECTION, POWDER, FOR SOLUTION INTRAMUSCULAR; INTRAVENOUS
Status: ACTIVE | COMMUNITY
Start: 2020-10-21

## 2022-04-25 RX ORDER — DIPHENHYDRAMINE HYDROCHLORIDE 50 MG/ML
25 MG INJECTION INTRAMUSCULAR; INTRAVENOUS
Qty: 25 MG | Refills: 11 | Status: ACTIVE | COMMUNITY
Start: 2021-12-21

## 2022-05-26 ENCOUNTER — OFFICE VISIT (OUTPATIENT)
Dept: URBAN - METROPOLITAN AREA CLINIC 97 | Facility: CLINIC | Age: 42
End: 2022-05-26
Payer: COMMERCIAL

## 2022-05-26 VITALS
DIASTOLIC BLOOD PRESSURE: 81 MMHG | WEIGHT: 202 LBS | RESPIRATION RATE: 16 BRPM | SYSTOLIC BLOOD PRESSURE: 114 MMHG | HEART RATE: 86 BPM | HEIGHT: 67 IN | TEMPERATURE: 97 F | BODY MASS INDEX: 31.71 KG/M2

## 2022-05-26 DIAGNOSIS — K51.80 CHRONIC PANCOLONIC ULCERATIVE COLITIS: ICD-10-CM

## 2022-05-26 PROCEDURE — 96415 CHEMO IV INFUSION ADDL HR: CPT | Performed by: INTERNAL MEDICINE

## 2022-05-26 PROCEDURE — 96375 TX/PRO/DX INJ NEW DRUG ADDON: CPT | Performed by: INTERNAL MEDICINE

## 2022-05-26 PROCEDURE — 96413 CHEMO IV INFUSION 1 HR: CPT | Performed by: INTERNAL MEDICINE

## 2022-05-26 RX ORDER — ONDANSETRON 2 MG/ML
4 MG INJECTION, SOLUTION INTRAMUSCULAR; INTRAVENOUS
Qty: 4 MG | Refills: 11 | Status: ACTIVE | COMMUNITY
Start: 2021-11-19

## 2022-05-26 RX ORDER — GABAPENTIN 300 MG/1
TAKE 1 CAPSULE BY ORAL ROUTE ONCE A DAY (AT BEDTIME) CAPSULE ORAL 1
Qty: 0 | Refills: 0 | Status: ACTIVE | COMMUNITY
Start: 1900-01-01

## 2022-05-26 RX ORDER — INFLIXIMAB 100 MG/10ML
900 MG INJECTION, POWDER, LYOPHILIZED, FOR SOLUTION INTRAVENOUS
Status: ACTIVE | COMMUNITY
Start: 2020-10-21

## 2022-05-26 RX ORDER — HYDROCORTISONE SODIUM SUCCINATE 100 MG/2ML
200 MG INJECTION, POWDER, FOR SOLUTION INTRAMUSCULAR; INTRAVENOUS
Status: ACTIVE | COMMUNITY
Start: 2020-10-21

## 2022-05-26 RX ORDER — DIPHENHYDRAMINE HYDROCHLORIDE 50 MG/ML
25 MG INJECTION INTRAMUSCULAR; INTRAVENOUS
Status: ACTIVE | COMMUNITY
Start: 2020-10-21

## 2022-05-26 RX ORDER — LINACLOTIDE 145 UG/1
TAKE ONE CAPSULE BY MOUTH EVERY DAY ON AN EMPTY STOMACH AT LEAST 30 MINUTES BEFORE 1ST MEAL OF THE DAY CAPSULE, GELATIN COATED ORAL
Qty: 30 | Refills: 3 | Status: ACTIVE | COMMUNITY

## 2022-05-26 RX ORDER — INFLIXIMAB 100 MG/10ML
AS DIRECTED INJECTION, POWDER, LYOPHILIZED, FOR SOLUTION INTRAVENOUS
Qty: 100 MILLIGRAMS | Refills: 11 | Status: ACTIVE | COMMUNITY
Start: 2021-12-21 | End: 2022-12-16

## 2022-05-26 RX ORDER — DIPHENHYDRAMINE HYDROCHLORIDE 50 MG/ML
25 MG INJECTION INTRAMUSCULAR; INTRAVENOUS
Qty: 25 MG | Refills: 11 | Status: ACTIVE | COMMUNITY
Start: 2021-12-21

## 2022-05-26 RX ORDER — HYOSCYAMINE SULFATE 0.12 MG/1
1 TABLET TABLET SUBLINGUAL
Qty: 90 | Refills: 3 | Status: ACTIVE | COMMUNITY
Start: 2021-08-14 | End: 2022-11-14

## 2022-06-22 ENCOUNTER — OFFICE VISIT (OUTPATIENT)
Dept: URBAN - METROPOLITAN AREA CLINIC 97 | Facility: CLINIC | Age: 42
End: 2022-06-22
Payer: COMMERCIAL

## 2022-06-22 VITALS
RESPIRATION RATE: 16 BRPM | SYSTOLIC BLOOD PRESSURE: 143 MMHG | BODY MASS INDEX: 31.71 KG/M2 | TEMPERATURE: 96.2 F | HEART RATE: 81 BPM | DIASTOLIC BLOOD PRESSURE: 74 MMHG | HEIGHT: 67 IN | WEIGHT: 202 LBS

## 2022-06-22 DIAGNOSIS — K51.80 CHRONIC PANCOLONIC ULCERATIVE COLITIS: ICD-10-CM

## 2022-06-22 PROCEDURE — 96415 CHEMO IV INFUSION ADDL HR: CPT | Performed by: INTERNAL MEDICINE

## 2022-06-22 PROCEDURE — 96413 CHEMO IV INFUSION 1 HR: CPT | Performed by: INTERNAL MEDICINE

## 2022-06-22 PROCEDURE — 96375 TX/PRO/DX INJ NEW DRUG ADDON: CPT | Performed by: INTERNAL MEDICINE

## 2022-06-22 RX ORDER — HYOSCYAMINE SULFATE 0.12 MG/1
1 TABLET TABLET SUBLINGUAL
Qty: 90 | Refills: 3 | Status: ACTIVE | COMMUNITY
Start: 2021-08-14 | End: 2022-11-14

## 2022-06-22 RX ORDER — DIPHENHYDRAMINE HYDROCHLORIDE 50 MG/ML
25 MG INJECTION INTRAMUSCULAR; INTRAVENOUS
Qty: 25 MG | Refills: 11 | Status: ACTIVE | COMMUNITY
Start: 2021-12-21

## 2022-06-22 RX ORDER — INFLIXIMAB 100 MG/10ML
900 MG INJECTION, POWDER, LYOPHILIZED, FOR SOLUTION INTRAVENOUS
Status: ACTIVE | COMMUNITY
Start: 2020-10-21

## 2022-06-22 RX ORDER — ONDANSETRON 2 MG/ML
4 MG INJECTION, SOLUTION INTRAMUSCULAR; INTRAVENOUS
Qty: 4 MG | Refills: 11 | Status: ACTIVE | COMMUNITY
Start: 2021-11-19

## 2022-06-22 RX ORDER — LINACLOTIDE 145 UG/1
TAKE ONE CAPSULE BY MOUTH EVERY DAY ON AN EMPTY STOMACH AT LEAST 30 MINUTES BEFORE 1ST MEAL OF THE DAY CAPSULE, GELATIN COATED ORAL
Qty: 30 | Refills: 3 | Status: ACTIVE | COMMUNITY

## 2022-06-22 RX ORDER — GABAPENTIN 300 MG/1
TAKE 1 CAPSULE BY ORAL ROUTE ONCE A DAY (AT BEDTIME) CAPSULE ORAL 1
Qty: 0 | Refills: 0 | Status: ACTIVE | COMMUNITY
Start: 1900-01-01

## 2022-06-22 RX ORDER — HYDROCORTISONE SODIUM SUCCINATE 100 MG/2ML
200 MG INJECTION, POWDER, FOR SOLUTION INTRAMUSCULAR; INTRAVENOUS
Status: ACTIVE | COMMUNITY
Start: 2020-10-21

## 2022-06-22 RX ORDER — INFLIXIMAB 100 MG/10ML
AS DIRECTED INJECTION, POWDER, LYOPHILIZED, FOR SOLUTION INTRAVENOUS
Qty: 100 MILLIGRAMS | Refills: 11 | Status: ACTIVE | COMMUNITY
Start: 2021-12-21 | End: 2022-12-16

## 2022-06-22 RX ORDER — DIPHENHYDRAMINE HYDROCHLORIDE 50 MG/ML
25 MG INJECTION INTRAMUSCULAR; INTRAVENOUS
Status: ACTIVE | COMMUNITY
Start: 2020-10-21

## 2022-07-02 PROBLEM — 64766004 ULCERATIVE COLITIS: Status: ACTIVE | Noted: 2022-07-02

## 2022-07-22 ENCOUNTER — CLAIMS CREATED FROM THE CLAIM WINDOW (OUTPATIENT)
Dept: URBAN - METROPOLITAN AREA CLINIC 97 | Facility: CLINIC | Age: 42
End: 2022-07-22

## 2022-07-22 ENCOUNTER — OFFICE VISIT (OUTPATIENT)
Dept: URBAN - METROPOLITAN AREA CLINIC 97 | Facility: CLINIC | Age: 42
End: 2022-07-22

## 2022-07-22 ENCOUNTER — CLAIMS CREATED FROM THE CLAIM WINDOW (OUTPATIENT)
Dept: URBAN - METROPOLITAN AREA CLINIC 97 | Facility: CLINIC | Age: 42
End: 2022-07-22
Payer: COMMERCIAL

## 2022-07-22 VITALS
HEIGHT: 67 IN | SYSTOLIC BLOOD PRESSURE: 131 MMHG | TEMPERATURE: 96 F | DIASTOLIC BLOOD PRESSURE: 83 MMHG | BODY MASS INDEX: 30.76 KG/M2 | WEIGHT: 196 LBS | RESPIRATION RATE: 20 BRPM | HEART RATE: 79 BPM

## 2022-07-22 DIAGNOSIS — K51.90 ACUTE ULCERATIVE COLITIS: ICD-10-CM

## 2022-07-22 PROCEDURE — 96375 TX/PRO/DX INJ NEW DRUG ADDON: CPT | Performed by: INTERNAL MEDICINE

## 2022-07-22 PROCEDURE — 96413 CHEMO IV INFUSION 1 HR: CPT | Performed by: INTERNAL MEDICINE

## 2022-07-22 PROCEDURE — 96415 CHEMO IV INFUSION ADDL HR: CPT | Performed by: INTERNAL MEDICINE

## 2022-07-22 RX ORDER — GABAPENTIN 300 MG/1
TAKE 1 CAPSULE BY ORAL ROUTE ONCE A DAY (AT BEDTIME) CAPSULE ORAL 1
Qty: 0 | Refills: 0 | Status: ACTIVE | COMMUNITY
Start: 1900-01-01

## 2022-07-22 RX ORDER — HYOSCYAMINE SULFATE 0.12 MG/1
1 TABLET TABLET SUBLINGUAL
Qty: 90 | Refills: 3 | Status: ACTIVE | COMMUNITY
Start: 2021-08-14 | End: 2022-11-14

## 2022-07-22 RX ORDER — INFLIXIMAB 100 MG/10ML
900 MG INJECTION, POWDER, LYOPHILIZED, FOR SOLUTION INTRAVENOUS
Status: ACTIVE | COMMUNITY
Start: 2020-10-21

## 2022-07-22 RX ORDER — DIPHENHYDRAMINE HYDROCHLORIDE 50 MG/ML
25 MG INJECTION INTRAMUSCULAR; INTRAVENOUS
Qty: 25 MG | Refills: 11 | Status: ACTIVE | COMMUNITY
Start: 2021-12-21

## 2022-07-22 RX ORDER — DIPHENHYDRAMINE HYDROCHLORIDE 50 MG/ML
25 MG INJECTION INTRAMUSCULAR; INTRAVENOUS
Status: ACTIVE | COMMUNITY
Start: 2020-10-21

## 2022-07-22 RX ORDER — HYDROCORTISONE SODIUM SUCCINATE 100 MG/2ML
200 MG INJECTION, POWDER, FOR SOLUTION INTRAMUSCULAR; INTRAVENOUS
Status: ACTIVE | COMMUNITY
Start: 2020-10-21

## 2022-07-22 RX ORDER — ONDANSETRON 2 MG/ML
4 MG INJECTION, SOLUTION INTRAMUSCULAR; INTRAVENOUS
Qty: 4 MG | Refills: 11 | Status: ACTIVE | COMMUNITY
Start: 2021-11-19

## 2022-07-22 RX ORDER — INFLIXIMAB 100 MG/10ML
AS DIRECTED INJECTION, POWDER, LYOPHILIZED, FOR SOLUTION INTRAVENOUS
Qty: 100 MILLIGRAMS | Refills: 11 | Status: ACTIVE | COMMUNITY
Start: 2021-12-21 | End: 2022-12-16

## 2022-07-22 RX ORDER — LINACLOTIDE 145 UG/1
TAKE ONE CAPSULE BY MOUTH EVERY DAY ON AN EMPTY STOMACH AT LEAST 30 MINUTES BEFORE 1ST MEAL OF THE DAY CAPSULE, GELATIN COATED ORAL
Qty: 30 | Refills: 3 | Status: ACTIVE | COMMUNITY

## 2022-08-19 ENCOUNTER — OFFICE VISIT (OUTPATIENT)
Dept: URBAN - METROPOLITAN AREA CLINIC 97 | Facility: CLINIC | Age: 42
End: 2022-08-19
Payer: COMMERCIAL

## 2022-08-19 VITALS
WEIGHT: 194 LBS | DIASTOLIC BLOOD PRESSURE: 69 MMHG | TEMPERATURE: 97.3 F | HEIGHT: 67 IN | BODY MASS INDEX: 30.45 KG/M2 | SYSTOLIC BLOOD PRESSURE: 137 MMHG | HEART RATE: 75 BPM | RESPIRATION RATE: 18 BRPM

## 2022-08-19 DIAGNOSIS — K51.90 ACUTE ULCERATIVE COLITIS: ICD-10-CM

## 2022-08-19 PROCEDURE — 96415 CHEMO IV INFUSION ADDL HR: CPT | Performed by: INTERNAL MEDICINE

## 2022-08-19 PROCEDURE — 96413 CHEMO IV INFUSION 1 HR: CPT | Performed by: INTERNAL MEDICINE

## 2022-08-19 PROCEDURE — 96375 TX/PRO/DX INJ NEW DRUG ADDON: CPT | Performed by: INTERNAL MEDICINE

## 2022-08-19 RX ORDER — DIPHENHYDRAMINE HYDROCHLORIDE 50 MG/ML
25 MG INJECTION INTRAMUSCULAR; INTRAVENOUS
Qty: 25 MG | Refills: 11 | Status: ACTIVE | COMMUNITY
Start: 2021-12-21

## 2022-08-19 RX ORDER — INFLIXIMAB 100 MG/10ML
AS DIRECTED INJECTION, POWDER, LYOPHILIZED, FOR SOLUTION INTRAVENOUS
Qty: 100 MILLIGRAMS | Refills: 11 | Status: ACTIVE | COMMUNITY
Start: 2021-12-21 | End: 2022-12-16

## 2022-08-19 RX ORDER — GABAPENTIN 300 MG/1
TAKE 1 CAPSULE BY ORAL ROUTE ONCE A DAY (AT BEDTIME) CAPSULE ORAL 1
Qty: 0 | Refills: 0 | Status: ACTIVE | COMMUNITY
Start: 1900-01-01

## 2022-08-19 RX ORDER — DIPHENHYDRAMINE HYDROCHLORIDE 50 MG/ML
25 MG INJECTION INTRAMUSCULAR; INTRAVENOUS
Status: ACTIVE | COMMUNITY
Start: 2020-10-21

## 2022-08-19 RX ORDER — INFLIXIMAB 100 MG/10ML
900 MG INJECTION, POWDER, LYOPHILIZED, FOR SOLUTION INTRAVENOUS
Status: ACTIVE | COMMUNITY
Start: 2020-10-21

## 2022-08-19 RX ORDER — HYDROCORTISONE SODIUM SUCCINATE 100 MG/2ML
200 MG INJECTION, POWDER, FOR SOLUTION INTRAMUSCULAR; INTRAVENOUS
Status: ACTIVE | COMMUNITY
Start: 2020-10-21

## 2022-08-19 RX ORDER — ONDANSETRON 2 MG/ML
4 MG INJECTION, SOLUTION INTRAMUSCULAR; INTRAVENOUS
Qty: 4 MG | Refills: 11 | Status: ACTIVE | COMMUNITY
Start: 2021-11-19

## 2022-08-19 RX ORDER — LINACLOTIDE 145 UG/1
TAKE ONE CAPSULE BY MOUTH EVERY DAY ON AN EMPTY STOMACH AT LEAST 30 MINUTES BEFORE 1ST MEAL OF THE DAY CAPSULE, GELATIN COATED ORAL
Qty: 30 | Refills: 3 | Status: ACTIVE | COMMUNITY

## 2022-08-19 RX ORDER — HYOSCYAMINE SULFATE 0.12 MG/1
1 TABLET TABLET SUBLINGUAL
Qty: 90 | Refills: 3 | Status: ACTIVE | COMMUNITY
Start: 2021-08-14 | End: 2022-11-14

## 2022-09-16 ENCOUNTER — OFFICE VISIT (OUTPATIENT)
Dept: URBAN - METROPOLITAN AREA CLINIC 97 | Facility: CLINIC | Age: 42
End: 2022-09-16

## 2022-09-23 ENCOUNTER — OFFICE VISIT (OUTPATIENT)
Dept: URBAN - METROPOLITAN AREA CLINIC 97 | Facility: CLINIC | Age: 42
End: 2022-09-23
Payer: COMMERCIAL

## 2022-09-23 ENCOUNTER — TELEPHONE ENCOUNTER (OUTPATIENT)
Dept: URBAN - METROPOLITAN AREA CLINIC 105 | Facility: CLINIC | Age: 42
End: 2022-09-23

## 2022-09-23 VITALS
HEIGHT: 67 IN | RESPIRATION RATE: 20 BRPM | SYSTOLIC BLOOD PRESSURE: 147 MMHG | TEMPERATURE: 98.4 F | BODY MASS INDEX: 30.29 KG/M2 | WEIGHT: 193 LBS | HEART RATE: 91 BPM | DIASTOLIC BLOOD PRESSURE: 88 MMHG

## 2022-09-23 DIAGNOSIS — K51.90 ACUTE ULCERATIVE COLITIS: ICD-10-CM

## 2022-09-23 PROCEDURE — 96375 TX/PRO/DX INJ NEW DRUG ADDON: CPT | Performed by: INTERNAL MEDICINE

## 2022-09-23 PROCEDURE — 96413 CHEMO IV INFUSION 1 HR: CPT | Performed by: INTERNAL MEDICINE

## 2022-09-23 PROCEDURE — 96415 CHEMO IV INFUSION ADDL HR: CPT | Performed by: INTERNAL MEDICINE

## 2022-09-23 RX ORDER — DIPHENHYDRAMINE HYDROCHLORIDE 50 MG/ML
25 MG INJECTION INTRAMUSCULAR; INTRAVENOUS
Status: ACTIVE | COMMUNITY
Start: 2020-10-21

## 2022-09-23 RX ORDER — GABAPENTIN 300 MG/1
TAKE 1 CAPSULE BY ORAL ROUTE ONCE A DAY (AT BEDTIME) CAPSULE ORAL 1
Qty: 0 | Refills: 0 | Status: ACTIVE | COMMUNITY
Start: 1900-01-01

## 2022-09-23 RX ORDER — INFLIXIMAB 100 MG/10ML
AS DIRECTED INJECTION, POWDER, LYOPHILIZED, FOR SOLUTION INTRAVENOUS
Qty: 100 MILLIGRAMS | Refills: 11 | Status: ACTIVE | COMMUNITY
Start: 2021-12-21 | End: 2022-12-16

## 2022-09-23 RX ORDER — ONDANSETRON 2 MG/ML
4 MG INJECTION, SOLUTION INTRAMUSCULAR; INTRAVENOUS
Qty: 4 MG | Refills: 11 | Status: ACTIVE | COMMUNITY
Start: 2021-11-19

## 2022-09-23 RX ORDER — HYOSCYAMINE SULFATE 0.12 MG/1
1 TABLET TABLET SUBLINGUAL
Qty: 90 | Refills: 3 | Status: ACTIVE | COMMUNITY
Start: 2021-08-14 | End: 2022-11-14

## 2022-09-23 RX ORDER — LINACLOTIDE 145 UG/1
TAKE ONE CAPSULE BY MOUTH EVERY DAY ON AN EMPTY STOMACH AT LEAST 30 MINUTES BEFORE 1ST MEAL OF THE DAY CAPSULE, GELATIN COATED ORAL
Qty: 30 | Refills: 3 | Status: ACTIVE | COMMUNITY

## 2022-09-23 RX ORDER — HYDROCORTISONE SODIUM SUCCINATE 100 MG/2ML
200 MG INJECTION, POWDER, FOR SOLUTION INTRAMUSCULAR; INTRAVENOUS
Status: ACTIVE | COMMUNITY
Start: 2020-10-21

## 2022-09-23 RX ORDER — DIPHENHYDRAMINE HYDROCHLORIDE 50 MG/ML
25 MG INJECTION INTRAMUSCULAR; INTRAVENOUS
Qty: 25 MG | Refills: 11 | Status: ACTIVE | COMMUNITY
Start: 2021-12-21

## 2022-09-23 RX ORDER — INFLIXIMAB 100 MG/10ML
900 MG INJECTION, POWDER, LYOPHILIZED, FOR SOLUTION INTRAVENOUS
Status: ACTIVE | COMMUNITY
Start: 2020-10-21

## 2022-10-14 ENCOUNTER — OFFICE VISIT (OUTPATIENT)
Dept: URBAN - METROPOLITAN AREA CLINIC 97 | Facility: CLINIC | Age: 42
End: 2022-10-14

## 2022-10-21 ENCOUNTER — TELEPHONE ENCOUNTER (OUTPATIENT)
Dept: URBAN - METROPOLITAN AREA CLINIC 18 | Facility: CLINIC | Age: 42
End: 2022-10-21

## 2022-10-21 ENCOUNTER — OFFICE VISIT (OUTPATIENT)
Dept: URBAN - METROPOLITAN AREA CLINIC 97 | Facility: CLINIC | Age: 42
End: 2022-10-21
Payer: COMMERCIAL

## 2022-10-21 VITALS
RESPIRATION RATE: 16 BRPM | HEIGHT: 67 IN | DIASTOLIC BLOOD PRESSURE: 85 MMHG | SYSTOLIC BLOOD PRESSURE: 150 MMHG | WEIGHT: 190 LBS | BODY MASS INDEX: 29.82 KG/M2 | HEART RATE: 85 BPM | TEMPERATURE: 96.9 F

## 2022-10-21 DIAGNOSIS — K51.80 CHRONIC PANCOLONIC ULCERATIVE COLITIS: ICD-10-CM

## 2022-10-21 PROCEDURE — 96375 TX/PRO/DX INJ NEW DRUG ADDON: CPT | Performed by: INTERNAL MEDICINE

## 2022-10-21 PROCEDURE — 96415 CHEMO IV INFUSION ADDL HR: CPT | Performed by: INTERNAL MEDICINE

## 2022-10-21 PROCEDURE — 96413 CHEMO IV INFUSION 1 HR: CPT | Performed by: INTERNAL MEDICINE

## 2022-10-21 RX ORDER — ONDANSETRON 2 MG/ML
4 MG INJECTION, SOLUTION INTRAMUSCULAR; INTRAVENOUS
Qty: 4 MG | Refills: 11 | Status: ACTIVE | COMMUNITY
Start: 2021-11-19

## 2022-10-21 RX ORDER — HYOSCYAMINE SULFATE 0.12 MG/1
1 TABLET TABLET SUBLINGUAL
Qty: 90 | Refills: 3 | Status: ACTIVE | COMMUNITY
Start: 2021-08-14 | End: 2022-11-14

## 2022-10-21 RX ORDER — INFLIXIMAB 100 MG/10ML
900 MG INJECTION, POWDER, LYOPHILIZED, FOR SOLUTION INTRAVENOUS
Status: ACTIVE | COMMUNITY
Start: 2020-10-21

## 2022-10-21 RX ORDER — HYDROCORTISONE SODIUM SUCCINATE 100 MG/2ML
200 MG INJECTION, POWDER, FOR SOLUTION INTRAMUSCULAR; INTRAVENOUS
Status: ACTIVE | COMMUNITY
Start: 2020-10-21

## 2022-10-21 RX ORDER — DIPHENHYDRAMINE HYDROCHLORIDE 50 MG/ML
25 MG INJECTION INTRAMUSCULAR; INTRAVENOUS
Qty: 25 MG | Refills: 11 | Status: ACTIVE | COMMUNITY
Start: 2021-12-21

## 2022-10-21 RX ORDER — LINACLOTIDE 145 UG/1
TAKE ONE CAPSULE BY MOUTH EVERY DAY ON AN EMPTY STOMACH AT LEAST 30 MINUTES BEFORE 1ST MEAL OF THE DAY CAPSULE, GELATIN COATED ORAL
Qty: 30 | Refills: 3 | Status: ACTIVE | COMMUNITY

## 2022-10-21 RX ORDER — GABAPENTIN 300 MG/1
TAKE 1 CAPSULE BY ORAL ROUTE ONCE A DAY (AT BEDTIME) CAPSULE ORAL 1
Qty: 0 | Refills: 0 | Status: ACTIVE | COMMUNITY
Start: 1900-01-01

## 2022-10-21 RX ORDER — INFLIXIMAB 100 MG/10ML
AS DIRECTED INJECTION, POWDER, LYOPHILIZED, FOR SOLUTION INTRAVENOUS
Qty: 100 MILLIGRAMS | Refills: 11 | Status: ACTIVE | COMMUNITY
Start: 2021-12-21 | End: 2022-12-16

## 2022-10-21 RX ORDER — DIPHENHYDRAMINE HYDROCHLORIDE 50 MG/ML
25 MG INJECTION INTRAMUSCULAR; INTRAVENOUS
Status: ACTIVE | COMMUNITY
Start: 2020-10-21

## 2022-11-09 ENCOUNTER — TELEPHONE ENCOUNTER (OUTPATIENT)
Dept: URBAN - METROPOLITAN AREA CLINIC 105 | Facility: CLINIC | Age: 42
End: 2022-11-09

## 2022-11-11 ENCOUNTER — OFFICE VISIT (OUTPATIENT)
Dept: URBAN - METROPOLITAN AREA CLINIC 97 | Facility: CLINIC | Age: 42
End: 2022-11-11

## 2022-11-16 ENCOUNTER — OFFICE VISIT (OUTPATIENT)
Dept: URBAN - METROPOLITAN AREA CLINIC 105 | Facility: CLINIC | Age: 42
End: 2022-11-16
Payer: COMMERCIAL

## 2022-11-16 ENCOUNTER — WEB ENCOUNTER (OUTPATIENT)
Dept: URBAN - METROPOLITAN AREA CLINIC 105 | Facility: CLINIC | Age: 42
End: 2022-11-16

## 2022-11-16 VITALS
DIASTOLIC BLOOD PRESSURE: 75 MMHG | SYSTOLIC BLOOD PRESSURE: 120 MMHG | HEIGHT: 67 IN | TEMPERATURE: 97.2 F | HEART RATE: 99 BPM | WEIGHT: 195.4 LBS | BODY MASS INDEX: 30.67 KG/M2

## 2022-11-16 DIAGNOSIS — K51.919 ULCERATIVE COLITIS WITH COMPLICATION, UNSPECIFIED LOCATION: ICD-10-CM

## 2022-11-16 DIAGNOSIS — E55.9 VITAMIN D DEFICIENCY: ICD-10-CM

## 2022-11-16 DIAGNOSIS — K59.09 CHANGE IN BOWEL MOVEMENTS INTERMITTENT CONSTIPATION. URGENCY IN THE MORNING.: ICD-10-CM

## 2022-11-16 DIAGNOSIS — K59.00 CONSTIPATION, UNSPECIFIED CONSTIPATION TYPE: ICD-10-CM

## 2022-11-16 DIAGNOSIS — R19.7 DIARRHEA, UNSPECIFIED: ICD-10-CM

## 2022-11-16 DIAGNOSIS — R11.0 NAUSEA: ICD-10-CM

## 2022-11-16 DIAGNOSIS — D50.9 IRON DEFICIENCY ANEMIA, UNSPECIFIED: ICD-10-CM

## 2022-11-16 DIAGNOSIS — D50.8 ACQUIRED IRON DEFICIENCY ANEMIA DUE TO DECREASED ABSORPTION: ICD-10-CM

## 2022-11-16 PROBLEM — 444548001: Status: ACTIVE | Noted: 2021-12-21

## 2022-11-16 PROBLEM — 14760008 CONSTIPATION: Status: ACTIVE | Noted: 2021-11-19

## 2022-11-16 PROBLEM — 87522002: Status: ACTIVE | Noted: 2022-11-16

## 2022-11-16 PROCEDURE — 99214 OFFICE O/P EST MOD 30 MIN: CPT | Performed by: INTERNAL MEDICINE

## 2022-11-16 RX ORDER — HYDROCORTISONE SODIUM SUCCINATE 100 MG/2ML
200 MG INJECTION, POWDER, FOR SOLUTION INTRAMUSCULAR; INTRAVENOUS
Status: ACTIVE | COMMUNITY
Start: 2020-10-21

## 2022-11-16 RX ORDER — INFLIXIMAB 100 MG/10ML
AS DIRECTED INJECTION, POWDER, LYOPHILIZED, FOR SOLUTION INTRAVENOUS
Qty: 100 MILLIGRAMS | Refills: 11 | Status: ACTIVE | COMMUNITY
Start: 2021-12-21 | End: 2022-12-16

## 2022-11-16 RX ORDER — DIPHENHYDRAMINE HYDROCHLORIDE 50 MG/ML
25 MG INJECTION INTRAMUSCULAR; INTRAVENOUS
Qty: 25 MG | Refills: 11 | Status: ACTIVE | COMMUNITY
Start: 2021-12-21

## 2022-11-16 RX ORDER — LINACLOTIDE 145 UG/1
TAKE ONE CAPSULE BY MOUTH EVERY DAY ON AN EMPTY STOMACH AT LEAST 30 MINUTES BEFORE 1ST MEAL OF THE DAY CAPSULE, GELATIN COATED ORAL
Qty: 30 | Refills: 3 | Status: ACTIVE | COMMUNITY

## 2022-11-16 RX ORDER — LINACLOTIDE 72 UG/1
1 CAPSULE AT LEAST 30 MINUTES BEFORE THE FIRST MEAL OF THE DAY ON AN EMPTY STOMACH CAPSULE, GELATIN COATED ORAL ONCE A DAY
Qty: 90 | Refills: 3 | OUTPATIENT

## 2022-11-16 RX ORDER — ONDANSETRON 2 MG/ML
4 MG INJECTION, SOLUTION INTRAMUSCULAR; INTRAVENOUS
Qty: 4 MG | Refills: 11 | Status: ACTIVE | COMMUNITY
Start: 2021-11-19

## 2022-11-16 RX ORDER — INFLIXIMAB 100 MG/10ML
900 MG INJECTION, POWDER, LYOPHILIZED, FOR SOLUTION INTRAVENOUS
Status: ACTIVE | COMMUNITY
Start: 2020-10-21

## 2022-11-16 RX ORDER — GABAPENTIN 300 MG/1
TAKE 1 CAPSULE BY ORAL ROUTE ONCE A DAY (AT BEDTIME) CAPSULE ORAL 1
Qty: 0 | Refills: 0 | Status: DISCONTINUED | COMMUNITY
Start: 1900-01-01

## 2022-11-16 RX ORDER — DIPHENHYDRAMINE HYDROCHLORIDE 50 MG/ML
25 MG INJECTION INTRAMUSCULAR; INTRAVENOUS
Status: ACTIVE | COMMUNITY
Start: 2020-10-21
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
## 2022-11-18 ENCOUNTER — OFFICE VISIT (OUTPATIENT)
Dept: URBAN - METROPOLITAN AREA CLINIC 97 | Facility: CLINIC | Age: 42
End: 2022-11-18
Payer: COMMERCIAL

## 2022-11-18 VITALS
RESPIRATION RATE: 18 BRPM | HEIGHT: 67 IN | BODY MASS INDEX: 29.82 KG/M2 | TEMPERATURE: 96.6 F | WEIGHT: 190 LBS | SYSTOLIC BLOOD PRESSURE: 152 MMHG | DIASTOLIC BLOOD PRESSURE: 83 MMHG | HEART RATE: 89 BPM

## 2022-11-18 DIAGNOSIS — K51.80 CHRONIC PANCOLONIC ULCERATIVE COLITIS: ICD-10-CM

## 2022-11-18 PROBLEM — 64766004: Status: ACTIVE | Noted: 2021-11-19

## 2022-11-18 PROBLEM — 87522002 IRON DEFICIENCY ANEMIA: Status: ACTIVE | Noted: 2021-11-19

## 2022-11-18 PROBLEM — 14760008: Status: ACTIVE | Noted: 2022-11-16

## 2022-11-18 PROBLEM — 34713006: Status: ACTIVE | Noted: 2021-11-19

## 2022-11-18 PROCEDURE — 96413 CHEMO IV INFUSION 1 HR: CPT | Performed by: INTERNAL MEDICINE

## 2022-11-18 PROCEDURE — 96415 CHEMO IV INFUSION ADDL HR: CPT | Performed by: INTERNAL MEDICINE

## 2022-11-18 PROCEDURE — 96375 TX/PRO/DX INJ NEW DRUG ADDON: CPT | Performed by: INTERNAL MEDICINE

## 2022-11-18 RX ORDER — INFLIXIMAB 100 MG/10ML
AS DIRECTED INJECTION, POWDER, LYOPHILIZED, FOR SOLUTION INTRAVENOUS
Qty: 100 MILLIGRAMS | Refills: 11 | Status: ACTIVE | COMMUNITY
Start: 2021-12-21 | End: 2022-12-16

## 2022-11-18 RX ORDER — DIPHENHYDRAMINE HYDROCHLORIDE 50 MG/ML
25 MG INJECTION INTRAMUSCULAR; INTRAVENOUS
Qty: 25 MG | Refills: 11 | Status: ACTIVE | COMMUNITY
Start: 2021-12-21

## 2022-11-18 RX ORDER — ONDANSETRON 2 MG/ML
4 MG INJECTION, SOLUTION INTRAMUSCULAR; INTRAVENOUS
Qty: 4 MG | Refills: 11 | Status: ACTIVE | COMMUNITY
Start: 2021-11-19

## 2022-11-18 RX ORDER — LINACLOTIDE 72 UG/1
1 CAPSULE AT LEAST 30 MINUTES BEFORE THE FIRST MEAL OF THE DAY ON AN EMPTY STOMACH CAPSULE, GELATIN COATED ORAL ONCE A DAY
Qty: 90 | Refills: 3 | Status: ACTIVE | COMMUNITY

## 2022-11-18 RX ORDER — INFLIXIMAB 100 MG/10ML
900 MG INJECTION, POWDER, LYOPHILIZED, FOR SOLUTION INTRAVENOUS
Status: ACTIVE | COMMUNITY
Start: 2020-10-21

## 2022-11-18 RX ORDER — DIPHENHYDRAMINE HYDROCHLORIDE 50 MG/ML
25 MG INJECTION INTRAMUSCULAR; INTRAVENOUS
Status: ACTIVE | COMMUNITY
Start: 2020-10-21

## 2022-11-18 RX ORDER — HYDROCORTISONE SODIUM SUCCINATE 100 MG/2ML
200 MG INJECTION, POWDER, FOR SOLUTION INTRAMUSCULAR; INTRAVENOUS
Status: ACTIVE | COMMUNITY
Start: 2020-10-21

## 2022-12-05 ENCOUNTER — TELEPHONE ENCOUNTER (OUTPATIENT)
Dept: URBAN - METROPOLITAN AREA CLINIC 105 | Facility: CLINIC | Age: 42
End: 2022-12-05

## 2022-12-07 ENCOUNTER — TELEPHONE ENCOUNTER (OUTPATIENT)
Dept: URBAN - METROPOLITAN AREA CLINIC 105 | Facility: CLINIC | Age: 42
End: 2022-12-07

## 2022-12-09 ENCOUNTER — OFFICE VISIT (OUTPATIENT)
Dept: URBAN - METROPOLITAN AREA CLINIC 97 | Facility: CLINIC | Age: 42
End: 2022-12-09

## 2022-12-16 ENCOUNTER — TELEPHONE ENCOUNTER (OUTPATIENT)
Dept: URBAN - METROPOLITAN AREA CLINIC 97 | Facility: CLINIC | Age: 42
End: 2022-12-16

## 2022-12-16 ENCOUNTER — OFFICE VISIT (OUTPATIENT)
Dept: URBAN - METROPOLITAN AREA CLINIC 97 | Facility: CLINIC | Age: 42
End: 2022-12-16
Payer: COMMERCIAL

## 2022-12-16 VITALS
BODY MASS INDEX: 29.82 KG/M2 | WEIGHT: 190 LBS | HEIGHT: 67 IN | DIASTOLIC BLOOD PRESSURE: 94 MMHG | HEART RATE: 79 BPM | SYSTOLIC BLOOD PRESSURE: 160 MMHG | TEMPERATURE: 97.1 F | RESPIRATION RATE: 18 BRPM

## 2022-12-16 DIAGNOSIS — K51.80 CHRONIC PANCOLONIC ULCERATIVE COLITIS: ICD-10-CM

## 2022-12-16 PROCEDURE — 96375 TX/PRO/DX INJ NEW DRUG ADDON: CPT | Performed by: INTERNAL MEDICINE

## 2022-12-16 PROCEDURE — 96413 CHEMO IV INFUSION 1 HR: CPT | Performed by: INTERNAL MEDICINE

## 2022-12-16 PROCEDURE — 96415 CHEMO IV INFUSION ADDL HR: CPT | Performed by: INTERNAL MEDICINE

## 2022-12-16 RX ORDER — HYDROCORTISONE SODIUM SUCCINATE 100 MG/2ML
200 MG INJECTION, POWDER, FOR SOLUTION INTRAMUSCULAR; INTRAVENOUS
Status: ACTIVE | COMMUNITY
Start: 2020-10-21

## 2022-12-16 RX ORDER — INFLIXIMAB 100 MG/10ML
900 MG INJECTION, POWDER, LYOPHILIZED, FOR SOLUTION INTRAVENOUS
Status: ACTIVE | COMMUNITY
Start: 2020-10-21

## 2022-12-16 RX ORDER — DIPHENHYDRAMINE HYDROCHLORIDE 50 MG/ML
25 MG INJECTION INTRAMUSCULAR; INTRAVENOUS
Qty: 25 MG | Refills: 11 | Status: ACTIVE | COMMUNITY
Start: 2021-12-21

## 2022-12-16 RX ORDER — INFLIXIMAB 100 MG/10ML
AS DIRECTED INJECTION, POWDER, LYOPHILIZED, FOR SOLUTION INTRAVENOUS
Qty: 100 MILLIGRAMS | Refills: 11 | Status: ACTIVE | COMMUNITY
Start: 2021-12-21 | End: 2022-12-16

## 2022-12-16 RX ORDER — ONDANSETRON 2 MG/ML
4 MG INJECTION, SOLUTION INTRAMUSCULAR; INTRAVENOUS
Qty: 4 MG | Refills: 11 | Status: ACTIVE | COMMUNITY
Start: 2021-11-19

## 2022-12-16 RX ORDER — LINACLOTIDE 72 UG/1
1 CAPSULE AT LEAST 30 MINUTES BEFORE THE FIRST MEAL OF THE DAY ON AN EMPTY STOMACH CAPSULE, GELATIN COATED ORAL ONCE A DAY
Qty: 90 | Refills: 3 | Status: ACTIVE | COMMUNITY

## 2022-12-16 RX ORDER — DIPHENHYDRAMINE HYDROCHLORIDE 50 MG/ML
25 MG INJECTION INTRAMUSCULAR; INTRAVENOUS
Status: ACTIVE | COMMUNITY
Start: 2020-10-21

## 2023-01-11 ENCOUNTER — TELEPHONE ENCOUNTER (OUTPATIENT)
Dept: URBAN - METROPOLITAN AREA CLINIC 105 | Facility: CLINIC | Age: 43
End: 2023-01-11

## 2023-01-11 RX ORDER — INFLIXIMAB 100 MG/10ML
900 MG (10 MG/KG) INJECTION, POWDER, LYOPHILIZED, FOR SOLUTION INTRAVENOUS
Qty: 900 | Refills: 11 | OUTPATIENT
Start: 2020-10-21 | End: 2023-12-14

## 2023-01-11 RX ORDER — DIPHENHYDRAMINE HYDROCHLORIDE 50 MG/ML
50 MG INJECTION INTRAMUSCULAR; INTRAVENOUS
OUTPATIENT
Start: 2020-10-21

## 2023-01-11 RX ORDER — ONDANSETRON 2 MG/ML
4 MG INJECTION, SOLUTION INTRAMUSCULAR; INTRAVENOUS
OUTPATIENT
Start: 2021-11-19

## 2023-01-11 RX ORDER — HYDROCORTISONE SODIUM SUCCINATE 100 MG/2ML
200 MG INJECTION, POWDER, FOR SOLUTION INTRAMUSCULAR; INTRAVENOUS
OUTPATIENT
Start: 2020-10-21

## 2023-01-12 PROBLEM — 444548001: Status: ACTIVE | Noted: 2023-01-12

## 2023-01-13 ENCOUNTER — OFFICE VISIT (OUTPATIENT)
Dept: URBAN - METROPOLITAN AREA CLINIC 97 | Facility: CLINIC | Age: 43
End: 2023-01-13
Payer: COMMERCIAL

## 2023-01-13 VITALS
WEIGHT: 191 LBS | TEMPERATURE: 96 F | DIASTOLIC BLOOD PRESSURE: 68 MMHG | HEART RATE: 85 BPM | HEIGHT: 67 IN | SYSTOLIC BLOOD PRESSURE: 130 MMHG | RESPIRATION RATE: 18 BRPM | BODY MASS INDEX: 29.98 KG/M2

## 2023-01-13 DIAGNOSIS — K51.80 CHRONIC PANCOLONIC ULCERATIVE COLITIS: ICD-10-CM

## 2023-01-13 PROCEDURE — 96375 TX/PRO/DX INJ NEW DRUG ADDON: CPT | Performed by: INTERNAL MEDICINE

## 2023-01-13 PROCEDURE — 96415 CHEMO IV INFUSION ADDL HR: CPT | Performed by: INTERNAL MEDICINE

## 2023-01-13 PROCEDURE — 96413 CHEMO IV INFUSION 1 HR: CPT | Performed by: INTERNAL MEDICINE

## 2023-01-13 RX ORDER — DIPHENHYDRAMINE HYDROCHLORIDE 50 MG/ML
50 MG INJECTION INTRAMUSCULAR; INTRAVENOUS
Status: ACTIVE | COMMUNITY
Start: 2020-10-21

## 2023-01-13 RX ORDER — HYDROCORTISONE SODIUM SUCCINATE 100 MG/2ML
200 MG INJECTION, POWDER, FOR SOLUTION INTRAMUSCULAR; INTRAVENOUS
Status: ACTIVE | COMMUNITY
Start: 2020-10-21

## 2023-01-13 RX ORDER — DIPHENHYDRAMINE HYDROCHLORIDE 50 MG/ML
25 MG INJECTION INTRAMUSCULAR; INTRAVENOUS
Qty: 25 MG | Refills: 11 | Status: ACTIVE | COMMUNITY
Start: 2021-12-21

## 2023-01-13 RX ORDER — INFLIXIMAB 100 MG/10ML
900 MG (10 MG/KG) INJECTION, POWDER, LYOPHILIZED, FOR SOLUTION INTRAVENOUS
Qty: 900 | Refills: 11 | Status: ACTIVE | COMMUNITY
Start: 2020-10-21 | End: 2023-12-14

## 2023-01-13 RX ORDER — LINACLOTIDE 72 UG/1
1 CAPSULE AT LEAST 30 MINUTES BEFORE THE FIRST MEAL OF THE DAY ON AN EMPTY STOMACH CAPSULE, GELATIN COATED ORAL ONCE A DAY
Qty: 90 | Refills: 3 | Status: ACTIVE | COMMUNITY

## 2023-01-13 RX ORDER — ONDANSETRON 2 MG/ML
4 MG INJECTION, SOLUTION INTRAMUSCULAR; INTRAVENOUS
Status: ACTIVE | COMMUNITY
Start: 2021-11-19

## 2023-02-10 ENCOUNTER — LAB OUTSIDE AN ENCOUNTER (OUTPATIENT)
Dept: URBAN - METROPOLITAN AREA CLINIC 105 | Facility: CLINIC | Age: 43
End: 2023-02-10

## 2023-02-10 ENCOUNTER — OFFICE VISIT (OUTPATIENT)
Dept: URBAN - METROPOLITAN AREA CLINIC 97 | Facility: CLINIC | Age: 43
End: 2023-02-10
Payer: COMMERCIAL

## 2023-02-10 VITALS
WEIGHT: 195 LBS | DIASTOLIC BLOOD PRESSURE: 71 MMHG | BODY MASS INDEX: 30.61 KG/M2 | SYSTOLIC BLOOD PRESSURE: 122 MMHG | RESPIRATION RATE: 18 BRPM | HEART RATE: 83 BPM | HEIGHT: 67 IN | TEMPERATURE: 97.2 F

## 2023-02-10 DIAGNOSIS — K51.80 CHRONIC PANCOLONIC ULCERATIVE COLITIS: ICD-10-CM

## 2023-02-10 PROCEDURE — 96415 CHEMO IV INFUSION ADDL HR: CPT | Performed by: INTERNAL MEDICINE

## 2023-02-10 PROCEDURE — 96413 CHEMO IV INFUSION 1 HR: CPT | Performed by: INTERNAL MEDICINE

## 2023-02-10 PROCEDURE — 96375 TX/PRO/DX INJ NEW DRUG ADDON: CPT | Performed by: INTERNAL MEDICINE

## 2023-02-10 RX ORDER — HYDROCORTISONE SODIUM SUCCINATE 100 MG/2ML
200 MG INJECTION, POWDER, FOR SOLUTION INTRAMUSCULAR; INTRAVENOUS
Status: ACTIVE | COMMUNITY
Start: 2020-10-21

## 2023-02-10 RX ORDER — ONDANSETRON 2 MG/ML
4 MG INJECTION, SOLUTION INTRAMUSCULAR; INTRAVENOUS
Status: ACTIVE | COMMUNITY
Start: 2021-11-19

## 2023-02-10 RX ORDER — LINACLOTIDE 72 UG/1
1 CAPSULE AT LEAST 30 MINUTES BEFORE THE FIRST MEAL OF THE DAY ON AN EMPTY STOMACH CAPSULE, GELATIN COATED ORAL ONCE A DAY
Qty: 90 | Refills: 3 | Status: ACTIVE | COMMUNITY

## 2023-02-10 RX ORDER — DIPHENHYDRAMINE HYDROCHLORIDE 50 MG/ML
50 MG INJECTION INTRAMUSCULAR; INTRAVENOUS
Status: ACTIVE | COMMUNITY
Start: 2020-10-21

## 2023-02-10 RX ORDER — INFLIXIMAB 100 MG/10ML
900 MG (10 MG/KG) INJECTION, POWDER, LYOPHILIZED, FOR SOLUTION INTRAVENOUS
Qty: 900 | Refills: 11 | Status: ACTIVE | COMMUNITY
Start: 2020-10-21 | End: 2023-12-14

## 2023-02-10 RX ORDER — DIPHENHYDRAMINE HYDROCHLORIDE 50 MG/ML
25 MG INJECTION INTRAMUSCULAR; INTRAVENOUS
Qty: 25 MG | Refills: 11 | Status: ACTIVE | COMMUNITY
Start: 2021-12-21

## 2023-02-15 LAB
QUANTIFERON CRITERIA: (no result)
QUANTIFERON INCUBATION: (no result)
QUANTIFERON MITOGEN VALUE: 1.06
QUANTIFERON NIL VALUE: 0.02
QUANTIFERON TB1 AG VALUE: 0.02
QUANTIFERON TB2 AG VALUE: 0.02
QUANTIFERON-TB GOLD PLUS: NEGATIVE

## 2023-03-10 ENCOUNTER — OFFICE VISIT (OUTPATIENT)
Dept: URBAN - METROPOLITAN AREA CLINIC 97 | Facility: CLINIC | Age: 43
End: 2023-03-10
Payer: COMMERCIAL

## 2023-03-10 VITALS
HEIGHT: 67 IN | WEIGHT: 197 LBS | SYSTOLIC BLOOD PRESSURE: 119 MMHG | TEMPERATURE: 97.9 F | DIASTOLIC BLOOD PRESSURE: 70 MMHG | RESPIRATION RATE: 16 BRPM | BODY MASS INDEX: 30.92 KG/M2 | HEART RATE: 78 BPM

## 2023-03-10 DIAGNOSIS — K51.80 CHRONIC PANCOLONIC ULCERATIVE COLITIS: ICD-10-CM

## 2023-03-10 PROCEDURE — 96413 CHEMO IV INFUSION 1 HR: CPT | Performed by: INTERNAL MEDICINE

## 2023-03-10 PROCEDURE — 96375 TX/PRO/DX INJ NEW DRUG ADDON: CPT | Performed by: INTERNAL MEDICINE

## 2023-03-10 PROCEDURE — 96415 CHEMO IV INFUSION ADDL HR: CPT | Performed by: INTERNAL MEDICINE

## 2023-03-10 RX ORDER — DIPHENHYDRAMINE HYDROCHLORIDE 50 MG/ML
25 MG INJECTION INTRAMUSCULAR; INTRAVENOUS
Qty: 25 MG | Refills: 11 | Status: ACTIVE | COMMUNITY
Start: 2021-12-21

## 2023-03-10 RX ORDER — LINACLOTIDE 72 UG/1
1 CAPSULE AT LEAST 30 MINUTES BEFORE THE FIRST MEAL OF THE DAY ON AN EMPTY STOMACH CAPSULE, GELATIN COATED ORAL ONCE A DAY
Qty: 90 | Refills: 3 | Status: ACTIVE | COMMUNITY

## 2023-03-10 RX ORDER — DIPHENHYDRAMINE HYDROCHLORIDE 50 MG/ML
50 MG INJECTION INTRAMUSCULAR; INTRAVENOUS
Status: ACTIVE | COMMUNITY
Start: 2020-10-21

## 2023-03-10 RX ORDER — HYDROCORTISONE SODIUM SUCCINATE 100 MG/2ML
200 MG INJECTION, POWDER, FOR SOLUTION INTRAMUSCULAR; INTRAVENOUS
Status: ACTIVE | COMMUNITY
Start: 2020-10-21

## 2023-03-10 RX ORDER — ONDANSETRON 2 MG/ML
4 MG INJECTION, SOLUTION INTRAMUSCULAR; INTRAVENOUS
Status: ACTIVE | COMMUNITY
Start: 2021-11-19

## 2023-03-10 RX ORDER — INFLIXIMAB 100 MG/10ML
900 MG (10 MG/KG) INJECTION, POWDER, LYOPHILIZED, FOR SOLUTION INTRAVENOUS
Qty: 900 | Refills: 11 | Status: ACTIVE | COMMUNITY
Start: 2020-10-21 | End: 2023-12-14

## 2023-04-07 ENCOUNTER — CLAIMS CREATED FROM THE CLAIM WINDOW (OUTPATIENT)
Dept: URBAN - METROPOLITAN AREA CLINIC 97 | Facility: CLINIC | Age: 43
End: 2023-04-07
Payer: COMMERCIAL

## 2023-04-07 VITALS
HEIGHT: 67 IN | WEIGHT: 197 LBS | DIASTOLIC BLOOD PRESSURE: 69 MMHG | HEART RATE: 82 BPM | SYSTOLIC BLOOD PRESSURE: 105 MMHG | BODY MASS INDEX: 30.92 KG/M2 | TEMPERATURE: 97.5 F | RESPIRATION RATE: 16 BRPM

## 2023-04-07 DIAGNOSIS — K51.80 ULCERATIVE COLITIS: ICD-10-CM

## 2023-04-07 PROCEDURE — 96415 CHEMO IV INFUSION ADDL HR: CPT | Performed by: INTERNAL MEDICINE

## 2023-04-07 PROCEDURE — 96413 CHEMO IV INFUSION 1 HR: CPT | Performed by: INTERNAL MEDICINE

## 2023-04-07 PROCEDURE — 96375 TX/PRO/DX INJ NEW DRUG ADDON: CPT | Performed by: INTERNAL MEDICINE

## 2023-04-07 RX ORDER — ONDANSETRON 2 MG/ML
4 MG INJECTION, SOLUTION INTRAMUSCULAR; INTRAVENOUS
Status: ACTIVE | COMMUNITY
Start: 2021-11-19

## 2023-04-07 RX ORDER — DIPHENHYDRAMINE HYDROCHLORIDE 50 MG/ML
50 MG INJECTION INTRAMUSCULAR; INTRAVENOUS
Status: ACTIVE | COMMUNITY
Start: 2020-10-21

## 2023-04-07 RX ORDER — INFLIXIMAB 100 MG/10ML
900 MG (10 MG/KG) INJECTION, POWDER, LYOPHILIZED, FOR SOLUTION INTRAVENOUS
Qty: 900 | Refills: 11 | Status: ACTIVE | COMMUNITY
Start: 2020-10-21 | End: 2023-12-14

## 2023-04-07 RX ORDER — HYDROCORTISONE SODIUM SUCCINATE 100 MG/2ML
200 MG INJECTION, POWDER, FOR SOLUTION INTRAMUSCULAR; INTRAVENOUS
Status: ACTIVE | COMMUNITY
Start: 2020-10-21

## 2023-04-07 RX ORDER — LINACLOTIDE 72 UG/1
1 CAPSULE AT LEAST 30 MINUTES BEFORE THE FIRST MEAL OF THE DAY ON AN EMPTY STOMACH CAPSULE, GELATIN COATED ORAL ONCE A DAY
Qty: 90 | Refills: 3 | Status: ACTIVE | COMMUNITY

## 2023-04-07 RX ORDER — DIPHENHYDRAMINE HYDROCHLORIDE 50 MG/ML
25 MG INJECTION INTRAMUSCULAR; INTRAVENOUS
Qty: 25 MG | Refills: 11 | Status: ACTIVE | COMMUNITY
Start: 2021-12-21

## 2023-05-10 PROBLEM — 444548001: Status: ACTIVE | Noted: 2023-05-10

## 2023-05-12 ENCOUNTER — OFFICE VISIT (OUTPATIENT)
Dept: URBAN - METROPOLITAN AREA CLINIC 97 | Facility: CLINIC | Age: 43
End: 2023-05-12
Payer: COMMERCIAL

## 2023-05-12 VITALS
HEART RATE: 84 BPM | RESPIRATION RATE: 18 BRPM | WEIGHT: 196.6 LBS | BODY MASS INDEX: 30.86 KG/M2 | HEIGHT: 67 IN | DIASTOLIC BLOOD PRESSURE: 73 MMHG | SYSTOLIC BLOOD PRESSURE: 107 MMHG | TEMPERATURE: 97.2 F

## 2023-05-12 DIAGNOSIS — K51.80 CHRONIC PANCOLONIC ULCERATIVE COLITIS: ICD-10-CM

## 2023-05-12 PROCEDURE — 96413 CHEMO IV INFUSION 1 HR: CPT | Performed by: INTERNAL MEDICINE

## 2023-05-12 PROCEDURE — 96375 TX/PRO/DX INJ NEW DRUG ADDON: CPT | Performed by: INTERNAL MEDICINE

## 2023-05-12 PROCEDURE — 96415 CHEMO IV INFUSION ADDL HR: CPT | Performed by: INTERNAL MEDICINE

## 2023-05-12 RX ORDER — HYDROCORTISONE SODIUM SUCCINATE 100 MG/2ML
200 MG INJECTION, POWDER, FOR SOLUTION INTRAMUSCULAR; INTRAVENOUS
Status: ACTIVE | COMMUNITY
Start: 2020-10-21

## 2023-05-12 RX ORDER — DIPHENHYDRAMINE HYDROCHLORIDE 50 MG/ML
50 MG INJECTION INTRAMUSCULAR; INTRAVENOUS
Status: ACTIVE | COMMUNITY
Start: 2020-10-21

## 2023-05-12 RX ORDER — ONDANSETRON 2 MG/ML
4 MG INJECTION, SOLUTION INTRAMUSCULAR; INTRAVENOUS
Status: ACTIVE | COMMUNITY
Start: 2021-11-19

## 2023-05-12 RX ORDER — INFLIXIMAB 100 MG/10ML
900 MG (10 MG/KG) INJECTION, POWDER, LYOPHILIZED, FOR SOLUTION INTRAVENOUS
Qty: 900 | Refills: 11 | Status: ACTIVE | COMMUNITY
Start: 2020-10-21 | End: 2023-12-14

## 2023-05-12 RX ORDER — DIPHENHYDRAMINE HYDROCHLORIDE 50 MG/ML
25 MG INJECTION INTRAMUSCULAR; INTRAVENOUS
Qty: 25 MG | Refills: 11 | Status: ACTIVE | COMMUNITY
Start: 2021-12-21

## 2023-05-12 RX ORDER — LINACLOTIDE 72 UG/1
1 CAPSULE AT LEAST 30 MINUTES BEFORE THE FIRST MEAL OF THE DAY ON AN EMPTY STOMACH CAPSULE, GELATIN COATED ORAL ONCE A DAY
Qty: 90 | Refills: 3 | Status: ACTIVE | COMMUNITY

## 2023-06-12 ENCOUNTER — OFFICE VISIT (OUTPATIENT)
Dept: URBAN - METROPOLITAN AREA CLINIC 97 | Facility: CLINIC | Age: 43
End: 2023-06-12
Payer: COMMERCIAL

## 2023-06-12 VITALS
TEMPERATURE: 97.5 F | RESPIRATION RATE: 18 BRPM | HEART RATE: 70 BPM | DIASTOLIC BLOOD PRESSURE: 75 MMHG | HEIGHT: 67 IN | BODY MASS INDEX: 31.23 KG/M2 | WEIGHT: 199 LBS | SYSTOLIC BLOOD PRESSURE: 128 MMHG

## 2023-06-12 DIAGNOSIS — K51.80 CHRONIC PANCOLONIC ULCERATIVE COLITIS: ICD-10-CM

## 2023-06-12 PROCEDURE — 96375 TX/PRO/DX INJ NEW DRUG ADDON: CPT | Performed by: INTERNAL MEDICINE

## 2023-06-12 PROCEDURE — 96415 CHEMO IV INFUSION ADDL HR: CPT | Performed by: INTERNAL MEDICINE

## 2023-06-12 PROCEDURE — 96413 CHEMO IV INFUSION 1 HR: CPT | Performed by: INTERNAL MEDICINE

## 2023-06-12 RX ORDER — DIPHENHYDRAMINE HYDROCHLORIDE 50 MG/ML
25 MG INJECTION INTRAMUSCULAR; INTRAVENOUS
Qty: 25 MG | Refills: 11 | Status: ACTIVE | COMMUNITY
Start: 2021-12-21

## 2023-06-12 RX ORDER — HYDROCORTISONE SODIUM SUCCINATE 100 MG/2ML
200 MG INJECTION, POWDER, FOR SOLUTION INTRAMUSCULAR; INTRAVENOUS
Status: ACTIVE | COMMUNITY
Start: 2020-10-21

## 2023-06-12 RX ORDER — DIPHENHYDRAMINE HYDROCHLORIDE 50 MG/ML
50 MG INJECTION INTRAMUSCULAR; INTRAVENOUS
Status: ACTIVE | COMMUNITY
Start: 2020-10-21

## 2023-06-12 RX ORDER — LINACLOTIDE 72 UG/1
1 CAPSULE AT LEAST 30 MINUTES BEFORE THE FIRST MEAL OF THE DAY ON AN EMPTY STOMACH CAPSULE, GELATIN COATED ORAL ONCE A DAY
Qty: 90 | Refills: 3 | Status: ACTIVE | COMMUNITY

## 2023-06-12 RX ORDER — ONDANSETRON 2 MG/ML
4 MG INJECTION, SOLUTION INTRAMUSCULAR; INTRAVENOUS
Status: ACTIVE | COMMUNITY
Start: 2021-11-19

## 2023-06-12 RX ORDER — INFLIXIMAB 100 MG/10ML
900 MG (10 MG/KG) INJECTION, POWDER, LYOPHILIZED, FOR SOLUTION INTRAVENOUS
Qty: 900 | Refills: 11 | Status: ACTIVE | COMMUNITY
Start: 2020-10-21 | End: 2023-12-14

## 2023-07-12 ENCOUNTER — OFFICE VISIT (OUTPATIENT)
Dept: URBAN - METROPOLITAN AREA CLINIC 97 | Facility: CLINIC | Age: 43
End: 2023-07-12
Payer: COMMERCIAL

## 2023-07-12 VITALS
HEART RATE: 80 BPM | HEIGHT: 67 IN | TEMPERATURE: 96.8 F | RESPIRATION RATE: 18 BRPM | BODY MASS INDEX: 30.92 KG/M2 | DIASTOLIC BLOOD PRESSURE: 90 MMHG | WEIGHT: 197 LBS

## 2023-07-12 DIAGNOSIS — K51.80 CHRONIC PANCOLONIC ULCERATIVE COLITIS: ICD-10-CM

## 2023-07-12 PROCEDURE — 96415 CHEMO IV INFUSION ADDL HR: CPT | Performed by: INTERNAL MEDICINE

## 2023-07-12 PROCEDURE — 96375 TX/PRO/DX INJ NEW DRUG ADDON: CPT | Performed by: INTERNAL MEDICINE

## 2023-07-12 PROCEDURE — 96413 CHEMO IV INFUSION 1 HR: CPT | Performed by: INTERNAL MEDICINE

## 2023-07-12 RX ORDER — DIPHENHYDRAMINE HYDROCHLORIDE 50 MG/ML
25 MG INJECTION INTRAMUSCULAR; INTRAVENOUS
Qty: 25 MG | Refills: 11 | Status: ACTIVE | COMMUNITY
Start: 2021-12-21

## 2023-07-12 RX ORDER — DIPHENHYDRAMINE HYDROCHLORIDE 50 MG/ML
50 MG INJECTION INTRAMUSCULAR; INTRAVENOUS
Status: ACTIVE | COMMUNITY
Start: 2020-10-21

## 2023-07-12 RX ORDER — ONDANSETRON 2 MG/ML
4 MG INJECTION, SOLUTION INTRAMUSCULAR; INTRAVENOUS
Status: ACTIVE | COMMUNITY
Start: 2021-11-19

## 2023-07-12 RX ORDER — INFLIXIMAB 100 MG/10ML
900 MG (10 MG/KG) INJECTION, POWDER, LYOPHILIZED, FOR SOLUTION INTRAVENOUS
Qty: 900 | Refills: 11 | Status: ACTIVE | COMMUNITY
Start: 2020-10-21 | End: 2023-12-14

## 2023-07-12 RX ORDER — LINACLOTIDE 72 UG/1
1 CAPSULE AT LEAST 30 MINUTES BEFORE THE FIRST MEAL OF THE DAY ON AN EMPTY STOMACH CAPSULE, GELATIN COATED ORAL ONCE A DAY
Qty: 90 | Refills: 3 | Status: ACTIVE | COMMUNITY

## 2023-07-12 RX ORDER — HYDROCORTISONE SODIUM SUCCINATE 100 MG/2ML
200 MG INJECTION, POWDER, FOR SOLUTION INTRAMUSCULAR; INTRAVENOUS
Status: ACTIVE | COMMUNITY
Start: 2020-10-21

## 2023-08-11 ENCOUNTER — OFFICE VISIT (OUTPATIENT)
Dept: URBAN - METROPOLITAN AREA CLINIC 97 | Facility: CLINIC | Age: 43
End: 2023-08-11
Payer: COMMERCIAL

## 2023-08-11 VITALS
BODY MASS INDEX: 31.08 KG/M2 | WEIGHT: 198 LBS | TEMPERATURE: 96.8 F | DIASTOLIC BLOOD PRESSURE: 80 MMHG | HEART RATE: 80 BPM | HEIGHT: 67 IN | RESPIRATION RATE: 18 BRPM | SYSTOLIC BLOOD PRESSURE: 128 MMHG

## 2023-08-11 DIAGNOSIS — K51.80 CHRONIC PANCOLONIC ULCERATIVE COLITIS: ICD-10-CM

## 2023-08-11 PROCEDURE — 96413 CHEMO IV INFUSION 1 HR: CPT | Performed by: INTERNAL MEDICINE

## 2023-08-11 PROCEDURE — 96375 TX/PRO/DX INJ NEW DRUG ADDON: CPT | Performed by: INTERNAL MEDICINE

## 2023-08-11 PROCEDURE — 96415 CHEMO IV INFUSION ADDL HR: CPT | Performed by: INTERNAL MEDICINE

## 2023-08-11 RX ORDER — INFLIXIMAB 100 MG/10ML
900 MG (10 MG/KG) INJECTION, POWDER, LYOPHILIZED, FOR SOLUTION INTRAVENOUS
Qty: 900 | Refills: 11 | Status: ACTIVE | COMMUNITY
Start: 2020-10-21 | End: 2023-12-14

## 2023-08-11 RX ORDER — HYDROCORTISONE SODIUM SUCCINATE 100 MG/2ML
200 MG INJECTION, POWDER, FOR SOLUTION INTRAMUSCULAR; INTRAVENOUS
Status: ACTIVE | COMMUNITY
Start: 2020-10-21

## 2023-08-11 RX ORDER — LINACLOTIDE 72 UG/1
1 CAPSULE AT LEAST 30 MINUTES BEFORE THE FIRST MEAL OF THE DAY ON AN EMPTY STOMACH CAPSULE, GELATIN COATED ORAL ONCE A DAY
Qty: 90 | Refills: 3 | Status: ACTIVE | COMMUNITY

## 2023-08-11 RX ORDER — DIPHENHYDRAMINE HYDROCHLORIDE 50 MG/ML
25 MG INJECTION INTRAMUSCULAR; INTRAVENOUS
Qty: 25 MG | Refills: 11 | Status: ACTIVE | COMMUNITY
Start: 2021-12-21

## 2023-08-11 RX ORDER — ONDANSETRON 2 MG/ML
4 MG INJECTION, SOLUTION INTRAMUSCULAR; INTRAVENOUS
Status: ACTIVE | COMMUNITY
Start: 2021-11-19

## 2023-08-11 RX ORDER — DIPHENHYDRAMINE HYDROCHLORIDE 50 MG/ML
50 MG INJECTION INTRAMUSCULAR; INTRAVENOUS
Status: ACTIVE | COMMUNITY
Start: 2020-10-21

## 2023-09-14 ENCOUNTER — OFFICE VISIT (OUTPATIENT)
Dept: URBAN - METROPOLITAN AREA CLINIC 97 | Facility: CLINIC | Age: 43
End: 2023-09-14
Payer: COMMERCIAL

## 2023-09-14 VITALS
HEART RATE: 82 BPM | BODY MASS INDEX: 31.67 KG/M2 | DIASTOLIC BLOOD PRESSURE: 78 MMHG | RESPIRATION RATE: 18 BRPM | SYSTOLIC BLOOD PRESSURE: 126 MMHG | HEIGHT: 67 IN | TEMPERATURE: 96.8 F | WEIGHT: 201.8 LBS

## 2023-09-14 DIAGNOSIS — K51.80 CHRONIC PANCOLONIC ULCERATIVE COLITIS: ICD-10-CM

## 2023-09-14 PROCEDURE — 96413 CHEMO IV INFUSION 1 HR: CPT | Performed by: INTERNAL MEDICINE

## 2023-09-14 PROCEDURE — 96415 CHEMO IV INFUSION ADDL HR: CPT | Performed by: INTERNAL MEDICINE

## 2023-09-14 PROCEDURE — 96375 TX/PRO/DX INJ NEW DRUG ADDON: CPT | Performed by: INTERNAL MEDICINE

## 2023-09-14 RX ORDER — DIPHENHYDRAMINE HYDROCHLORIDE 50 MG/ML
25 MG INJECTION INTRAMUSCULAR; INTRAVENOUS
Qty: 25 MG | Refills: 11 | Status: ACTIVE | COMMUNITY
Start: 2021-12-21

## 2023-09-14 RX ORDER — INFLIXIMAB 100 MG/10ML
900 MG (10 MG/KG) INJECTION, POWDER, LYOPHILIZED, FOR SOLUTION INTRAVENOUS
Qty: 900 | Refills: 11 | Status: ACTIVE | COMMUNITY
Start: 2020-10-21 | End: 2023-12-14

## 2023-09-14 RX ORDER — HYDROCORTISONE SODIUM SUCCINATE 100 MG/2ML
200 MG INJECTION, POWDER, FOR SOLUTION INTRAMUSCULAR; INTRAVENOUS
Status: ACTIVE | COMMUNITY
Start: 2020-10-21

## 2023-09-14 RX ORDER — DIPHENHYDRAMINE HYDROCHLORIDE 50 MG/ML
50 MG INJECTION INTRAMUSCULAR; INTRAVENOUS
Status: ACTIVE | COMMUNITY
Start: 2020-10-21

## 2023-09-14 RX ORDER — ONDANSETRON 2 MG/ML
4 MG INJECTION, SOLUTION INTRAMUSCULAR; INTRAVENOUS
Status: ACTIVE | COMMUNITY
Start: 2021-11-19

## 2023-09-14 RX ORDER — LINACLOTIDE 72 UG/1
1 CAPSULE AT LEAST 30 MINUTES BEFORE THE FIRST MEAL OF THE DAY ON AN EMPTY STOMACH CAPSULE, GELATIN COATED ORAL ONCE A DAY
Qty: 90 | Refills: 3 | Status: ACTIVE | COMMUNITY

## 2023-09-15 ENCOUNTER — OFFICE VISIT (OUTPATIENT)
Dept: URBAN - METROPOLITAN AREA CLINIC 97 | Facility: CLINIC | Age: 43
End: 2023-09-15

## 2023-10-13 ENCOUNTER — TELEPHONE ENCOUNTER (OUTPATIENT)
Dept: URBAN - METROPOLITAN AREA CLINIC 97 | Facility: CLINIC | Age: 43
End: 2023-10-13

## 2023-10-20 ENCOUNTER — OFFICE VISIT (OUTPATIENT)
Dept: URBAN - METROPOLITAN AREA CLINIC 97 | Facility: CLINIC | Age: 43
End: 2023-10-20
Payer: COMMERCIAL

## 2023-10-20 VITALS
BODY MASS INDEX: 31.86 KG/M2 | RESPIRATION RATE: 18 BRPM | TEMPERATURE: 96 F | HEART RATE: 80 BPM | HEIGHT: 67 IN | WEIGHT: 203 LBS | SYSTOLIC BLOOD PRESSURE: 156 MMHG | DIASTOLIC BLOOD PRESSURE: 94 MMHG

## 2023-10-20 DIAGNOSIS — K51.80 ULCERATIVE COLITIS: ICD-10-CM

## 2023-10-20 PROCEDURE — 96413 CHEMO IV INFUSION 1 HR: CPT | Performed by: INTERNAL MEDICINE

## 2023-10-20 PROCEDURE — 96375 TX/PRO/DX INJ NEW DRUG ADDON: CPT | Performed by: INTERNAL MEDICINE

## 2023-10-20 PROCEDURE — 96415 CHEMO IV INFUSION ADDL HR: CPT | Performed by: INTERNAL MEDICINE

## 2023-10-20 RX ORDER — LINACLOTIDE 72 UG/1
1 CAPSULE AT LEAST 30 MINUTES BEFORE THE FIRST MEAL OF THE DAY ON AN EMPTY STOMACH CAPSULE, GELATIN COATED ORAL ONCE A DAY
Qty: 90 | Refills: 3 | Status: ACTIVE | COMMUNITY

## 2023-10-20 RX ORDER — DIPHENHYDRAMINE HYDROCHLORIDE 50 MG/ML
25 MG INJECTION INTRAMUSCULAR; INTRAVENOUS
Qty: 25 MG | Refills: 11 | Status: ACTIVE | COMMUNITY
Start: 2021-12-21

## 2023-10-20 RX ORDER — ONDANSETRON 2 MG/ML
4 MG INJECTION, SOLUTION INTRAMUSCULAR; INTRAVENOUS
Status: ACTIVE | COMMUNITY
Start: 2021-11-19

## 2023-10-20 RX ORDER — INFLIXIMAB 100 MG/10ML
900 MG (10 MG/KG) INJECTION, POWDER, LYOPHILIZED, FOR SOLUTION INTRAVENOUS
Qty: 900 | Refills: 11 | Status: ACTIVE | COMMUNITY
Start: 2020-10-21 | End: 2023-12-14

## 2023-10-20 RX ORDER — HYDROCORTISONE SODIUM SUCCINATE 100 MG/2ML
200 MG INJECTION, POWDER, FOR SOLUTION INTRAMUSCULAR; INTRAVENOUS
Status: ACTIVE | COMMUNITY
Start: 2020-10-21

## 2023-10-20 RX ORDER — DIPHENHYDRAMINE HYDROCHLORIDE 50 MG/ML
50 MG INJECTION INTRAMUSCULAR; INTRAVENOUS
Status: ACTIVE | COMMUNITY
Start: 2020-10-21

## 2023-10-26 ENCOUNTER — TELEPHONE ENCOUNTER (OUTPATIENT)
Dept: URBAN - METROPOLITAN AREA CLINIC 105 | Facility: CLINIC | Age: 43
End: 2023-10-26

## 2023-10-27 ENCOUNTER — LAB OUTSIDE AN ENCOUNTER (OUTPATIENT)
Dept: URBAN - METROPOLITAN AREA CLINIC 105 | Facility: CLINIC | Age: 43
End: 2023-10-27

## 2023-10-27 ENCOUNTER — OFFICE VISIT (OUTPATIENT)
Dept: URBAN - METROPOLITAN AREA CLINIC 105 | Facility: CLINIC | Age: 43
End: 2023-10-27
Payer: COMMERCIAL

## 2023-10-27 VITALS
HEIGHT: 67 IN | SYSTOLIC BLOOD PRESSURE: 130 MMHG | HEART RATE: 70 BPM | WEIGHT: 195 LBS | BODY MASS INDEX: 30.61 KG/M2 | DIASTOLIC BLOOD PRESSURE: 63 MMHG | TEMPERATURE: 98.1 F

## 2023-10-27 DIAGNOSIS — R11.0 NAUSEA: ICD-10-CM

## 2023-10-27 DIAGNOSIS — R19.4 ALTERATION IN BOWEL ELIMINATION: ICD-10-CM

## 2023-10-27 DIAGNOSIS — K51.919 ULCERATIVE COLITIS WITH COMPLICATION, UNSPECIFIED LOCATION: ICD-10-CM

## 2023-10-27 DIAGNOSIS — E55.9 VITAMIN D DEFICIENCY: ICD-10-CM

## 2023-10-27 PROCEDURE — 99214 OFFICE O/P EST MOD 30 MIN: CPT | Performed by: INTERNAL MEDICINE

## 2023-10-27 RX ORDER — DIPHENHYDRAMINE HYDROCHLORIDE 50 MG/ML
50 MG INJECTION INTRAMUSCULAR; INTRAVENOUS
Status: ACTIVE | COMMUNITY
Start: 2020-10-21

## 2023-10-27 RX ORDER — CIPROFLOXACIN HYDROCHLORIDE 500 MG/1
1 TABLET TABLET, FILM COATED ORAL
Qty: 6 | Refills: 0 | OUTPATIENT
Start: 2023-10-27 | End: 2023-10-30

## 2023-10-27 RX ORDER — ONDANSETRON 8 MG/1
1 TABLET ON THE TONGUE AND ALLOW TO DISSOLVE AS NEEDED TABLET, ORALLY DISINTEGRATING ORAL
Qty: 30 | Refills: 5 | OUTPATIENT
Start: 2023-10-27

## 2023-10-27 RX ORDER — HYDROCORTISONE SODIUM SUCCINATE 100 MG/2ML
200 MG INJECTION, POWDER, FOR SOLUTION INTRAMUSCULAR; INTRAVENOUS
Status: ACTIVE | COMMUNITY
Start: 2020-10-21

## 2023-10-27 RX ORDER — INFLIXIMAB 100 MG/10ML
900 MG (10 MG/KG) INJECTION, POWDER, LYOPHILIZED, FOR SOLUTION INTRAVENOUS
Qty: 900 | Refills: 11 | Status: ACTIVE | COMMUNITY
Start: 2020-10-21 | End: 2023-12-14

## 2023-10-27 RX ORDER — IBUPROFEN 800 MG/1
1 TABLET WITH FOOD OR MILK AS NEEDED TABLET, FILM COATED ORAL
Status: ACTIVE | COMMUNITY

## 2023-10-27 RX ORDER — DIPHENHYDRAMINE HYDROCHLORIDE 50 MG/ML
25 MG INJECTION INTRAMUSCULAR; INTRAVENOUS
Qty: 25 MG | Refills: 11 | Status: ACTIVE | COMMUNITY
Start: 2021-12-21

## 2023-10-27 RX ORDER — LINACLOTIDE 72 UG/1
1 CAPSULE AT LEAST 30 MINUTES BEFORE THE FIRST MEAL OF THE DAY ON AN EMPTY STOMACH CAPSULE, GELATIN COATED ORAL ONCE A DAY
Qty: 90 | Refills: 3 | Status: ACTIVE | COMMUNITY

## 2023-10-27 RX ORDER — ONDANSETRON 2 MG/ML
4 MG INJECTION, SOLUTION INTRAMUSCULAR; INTRAVENOUS
Status: ACTIVE | COMMUNITY
Start: 2021-11-19
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
## 2023-10-30 ENCOUNTER — LAB OUTSIDE AN ENCOUNTER (OUTPATIENT)
Dept: URBAN - METROPOLITAN AREA CLINIC 105 | Facility: CLINIC | Age: 43
End: 2023-10-30

## 2023-11-02 LAB
A/G RATIO: 1.5
ALBUMIN: 4.6
ALKALINE PHOSPHATASE: 76
ALT (SGPT): 32
AST (SGOT): 22
BASO (ABSOLUTE): 0
BASOS: 0
BILIRUBIN, TOTAL: 0.4
BUN/CREATININE RATIO: 11
BUN: 9
CALCIUM: 9.7
CARBON DIOXIDE, TOTAL: 25
CHLORIDE: 98
CREATININE: 0.85
EGFR: 88
EOS (ABSOLUTE): 0.1
EOS: 1
FERRITIN, SERUM: 64
GLOBULIN, TOTAL: 3
GLUCOSE: 94
HEMATOCRIT: 34.2
HEMATOLOGY COMMENTS:: (no result)
HEMOGLOBIN: 11.2
IMMATURE CELLS: (no result)
IMMATURE GRANS (ABS): 0
IMMATURE GRANULOCYTES: 0
IRON BIND.CAP.(TIBC): 357
IRON SATURATION: 38
IRON: 134
LYMPHS (ABSOLUTE): 3
LYMPHS: 35
MCH: 29.9
MCHC: 32.7
MCV: 91
MONOCYTES(ABSOLUTE): 0.5
MONOCYTES: 5
NEUTROPHILS (ABSOLUTE): 5.2
NEUTROPHILS: 59
NRBC: (no result)
PLATELETS: 384
POTASSIUM: 3.4
PROTEIN, TOTAL: 7.6
QUANTIFERON CRITERIA: (no result)
QUANTIFERON INCUBATION: (no result)
QUANTIFERON MITOGEN VALUE: >10
QUANTIFERON NIL VALUE: 0.15
QUANTIFERON TB1 AG VALUE: 0.1
QUANTIFERON TB2 AG VALUE: 0.12
QUANTIFERON-TB GOLD PLUS: NEGATIVE
RBC: 3.74
RDW: 12
SODIUM: 138
UIBC: 223
VITAMIN D, 25-HYDROXY: 42.5
WBC: 8.8

## 2023-11-06 LAB
ADENOVIRUS F 40/41: NOT DETECTED
CAMPYLOBACTER: NOT DETECTED
CLOSTRIDIUM DIFFICILE: NOT DETECTED
ENTAMOEBA HISTOLYTICA: NOT DETECTED
ENTEROAGGREGATIVE E.COLI: NOT DETECTED
ENTEROTOXIGENIC E.COLI: NOT DETECTED
ESCHERICHIA COLI O157: NOT DETECTED
GIARDIA LAMBLIA: NOT DETECTED
NOROVIRUS GI/GII: DETECTED
ROTAVIRUS A: NOT DETECTED
SALMONELLA SPP.: NOT DETECTED
SHIGA-LIKE TOXIN PRODUCING E.COLI: NOT DETECTED
SHIGELLA SPP. / ENTEROINVASIVE E.COLI: NOT DETECTED
VIBRIO PARAHAEMOLYTICUS: NOT DETECTED
VIBRIO SPP.: NOT DETECTED
YERSINIA ENTEROCOLITICA: NOT DETECTED

## 2023-11-17 ENCOUNTER — OFFICE VISIT (OUTPATIENT)
Dept: URBAN - METROPOLITAN AREA CLINIC 97 | Facility: CLINIC | Age: 43
End: 2023-11-17
Payer: COMMERCIAL

## 2023-11-17 VITALS
SYSTOLIC BLOOD PRESSURE: 115 MMHG | WEIGHT: 195 LBS | DIASTOLIC BLOOD PRESSURE: 75 MMHG | TEMPERATURE: 97.7 F | RESPIRATION RATE: 18 BRPM | BODY MASS INDEX: 30.61 KG/M2 | HEART RATE: 70 BPM | HEIGHT: 67 IN

## 2023-11-17 DIAGNOSIS — K51.80 ULCERATIVE COLITIS: ICD-10-CM

## 2023-11-17 PROCEDURE — 96413 CHEMO IV INFUSION 1 HR: CPT | Performed by: INTERNAL MEDICINE

## 2023-11-17 PROCEDURE — 96415 CHEMO IV INFUSION ADDL HR: CPT | Performed by: INTERNAL MEDICINE

## 2023-11-17 PROCEDURE — 96375 TX/PRO/DX INJ NEW DRUG ADDON: CPT | Performed by: INTERNAL MEDICINE

## 2023-11-17 RX ORDER — IBUPROFEN 800 MG/1
1 TABLET WITH FOOD OR MILK AS NEEDED TABLET, FILM COATED ORAL
Status: ACTIVE | COMMUNITY

## 2023-11-17 RX ORDER — DIPHENHYDRAMINE HYDROCHLORIDE 50 MG/ML
50 MG INJECTION INTRAMUSCULAR; INTRAVENOUS
Status: ACTIVE | COMMUNITY
Start: 2020-10-21

## 2023-11-17 RX ORDER — DIPHENHYDRAMINE HYDROCHLORIDE 50 MG/ML
25 MG INJECTION INTRAMUSCULAR; INTRAVENOUS
Qty: 25 MG | Refills: 11 | Status: ACTIVE | COMMUNITY
Start: 2021-12-21

## 2023-11-17 RX ORDER — ONDANSETRON 8 MG/1
1 TABLET ON THE TONGUE AND ALLOW TO DISSOLVE AS NEEDED TABLET, ORALLY DISINTEGRATING ORAL
Qty: 30 | Refills: 5 | Status: ACTIVE | COMMUNITY
Start: 2023-10-27

## 2023-11-17 RX ORDER — HYDROCORTISONE SODIUM SUCCINATE 100 MG/2ML
200 MG INJECTION, POWDER, FOR SOLUTION INTRAMUSCULAR; INTRAVENOUS
Status: ACTIVE | COMMUNITY
Start: 2020-10-21

## 2023-11-17 RX ORDER — ONDANSETRON 2 MG/ML
4 MG INJECTION, SOLUTION INTRAMUSCULAR; INTRAVENOUS
Status: ACTIVE | COMMUNITY
Start: 2021-11-19

## 2023-11-17 RX ORDER — INFLIXIMAB 100 MG/10ML
900 MG (10 MG/KG) INJECTION, POWDER, LYOPHILIZED, FOR SOLUTION INTRAVENOUS
Qty: 900 | Refills: 11 | Status: ACTIVE | COMMUNITY
Start: 2020-10-21 | End: 2023-12-14

## 2023-11-17 RX ORDER — LINACLOTIDE 72 UG/1
1 CAPSULE AT LEAST 30 MINUTES BEFORE THE FIRST MEAL OF THE DAY ON AN EMPTY STOMACH CAPSULE, GELATIN COATED ORAL ONCE A DAY
Qty: 90 | Refills: 3 | Status: ACTIVE | COMMUNITY

## 2023-11-22 ENCOUNTER — LAB OUTSIDE AN ENCOUNTER (OUTPATIENT)
Dept: URBAN - METROPOLITAN AREA CLINIC 105 | Facility: CLINIC | Age: 43
End: 2023-11-22

## 2023-11-22 ENCOUNTER — DASHBOARD ENCOUNTERS (OUTPATIENT)
Age: 43
End: 2023-11-22

## 2023-11-22 ENCOUNTER — OFFICE VISIT (OUTPATIENT)
Dept: URBAN - METROPOLITAN AREA CLINIC 105 | Facility: CLINIC | Age: 43
End: 2023-11-22
Payer: COMMERCIAL

## 2023-11-22 VITALS
SYSTOLIC BLOOD PRESSURE: 129 MMHG | BODY MASS INDEX: 30.92 KG/M2 | DIASTOLIC BLOOD PRESSURE: 82 MMHG | HEIGHT: 67 IN | HEART RATE: 77 BPM | WEIGHT: 197 LBS | TEMPERATURE: 97.3 F

## 2023-11-22 DIAGNOSIS — K51.919 ULCERATIVE COLITIS WITH COMPLICATION: ICD-10-CM

## 2023-11-22 DIAGNOSIS — D50.9 ANEMIA: ICD-10-CM

## 2023-11-22 DIAGNOSIS — R11.0 NAUSEA: ICD-10-CM

## 2023-11-22 DIAGNOSIS — R19.4 ALTERATION IN BOWEL ELIMINATION: ICD-10-CM

## 2023-11-22 PROCEDURE — 99214 OFFICE O/P EST MOD 30 MIN: CPT | Performed by: INTERNAL MEDICINE

## 2023-11-22 RX ORDER — LINACLOTIDE 72 UG/1
1 CAPSULE AT LEAST 30 MINUTES BEFORE THE FIRST MEAL OF THE DAY ON AN EMPTY STOMACH CAPSULE, GELATIN COATED ORAL ONCE A DAY
Qty: 90 | Refills: 3 | Status: ACTIVE | COMMUNITY

## 2023-11-22 RX ORDER — HYDROCORTISONE SODIUM SUCCINATE 100 MG/2ML
200 MG INJECTION, POWDER, FOR SOLUTION INTRAMUSCULAR; INTRAVENOUS
Status: ACTIVE | COMMUNITY
Start: 2020-10-21

## 2023-11-22 RX ORDER — DIPHENHYDRAMINE HYDROCHLORIDE 50 MG/ML
25 MG INJECTION INTRAMUSCULAR; INTRAVENOUS
Qty: 25 MG | Refills: 11 | Status: ACTIVE | COMMUNITY
Start: 2021-12-21

## 2023-11-22 RX ORDER — ONDANSETRON 2 MG/ML
4 MG INJECTION, SOLUTION INTRAMUSCULAR; INTRAVENOUS
Status: ACTIVE | COMMUNITY
Start: 2021-11-19

## 2023-11-22 RX ORDER — DIPHENHYDRAMINE HYDROCHLORIDE 50 MG/ML
50 MG INJECTION INTRAMUSCULAR; INTRAVENOUS
Status: ACTIVE | COMMUNITY
Start: 2020-10-21

## 2023-11-22 RX ORDER — INFLIXIMAB 100 MG/10ML
900 MG (10 MG/KG) INJECTION, POWDER, LYOPHILIZED, FOR SOLUTION INTRAVENOUS
Qty: 900 | Refills: 11 | Status: ACTIVE | COMMUNITY
Start: 2020-10-21 | End: 2023-12-14

## 2023-11-22 RX ORDER — ONDANSETRON 8 MG/1
1 TABLET ON THE TONGUE AND ALLOW TO DISSOLVE AS NEEDED TABLET, ORALLY DISINTEGRATING ORAL
Qty: 30 | Refills: 5 | Status: ACTIVE | COMMUNITY
Start: 2023-10-27

## 2023-11-22 RX ORDER — IBUPROFEN 800 MG/1
1 TABLET WITH FOOD OR MILK AS NEEDED TABLET, FILM COATED ORAL
Status: ACTIVE | COMMUNITY
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
## 2023-12-15 ENCOUNTER — OFFICE VISIT (OUTPATIENT)
Dept: URBAN - METROPOLITAN AREA CLINIC 97 | Facility: CLINIC | Age: 43
End: 2023-12-15

## 2023-12-18 ENCOUNTER — OFFICE VISIT (OUTPATIENT)
Dept: URBAN - METROPOLITAN AREA CLINIC 97 | Facility: CLINIC | Age: 43
End: 2023-12-18
Payer: COMMERCIAL

## 2023-12-18 VITALS
SYSTOLIC BLOOD PRESSURE: 130 MMHG | RESPIRATION RATE: 18 BRPM | HEART RATE: 86 BPM | TEMPERATURE: 98.1 F | HEIGHT: 67 IN | BODY MASS INDEX: 31.2 KG/M2 | DIASTOLIC BLOOD PRESSURE: 76 MMHG | WEIGHT: 198.8 LBS

## 2023-12-18 DIAGNOSIS — K51.80 CHRONIC PANCOLONIC ULCERATIVE COLITIS: ICD-10-CM

## 2023-12-18 PROCEDURE — 96413 CHEMO IV INFUSION 1 HR: CPT | Performed by: INTERNAL MEDICINE

## 2023-12-18 PROCEDURE — 96375 TX/PRO/DX INJ NEW DRUG ADDON: CPT | Performed by: INTERNAL MEDICINE

## 2023-12-18 PROCEDURE — 96415 CHEMO IV INFUSION ADDL HR: CPT | Performed by: INTERNAL MEDICINE

## 2023-12-18 RX ORDER — ONDANSETRON 2 MG/ML
4 MG INJECTION, SOLUTION INTRAMUSCULAR; INTRAVENOUS
Status: ACTIVE | COMMUNITY
Start: 2021-11-19

## 2023-12-18 RX ORDER — IBUPROFEN 800 MG/1
1 TABLET WITH FOOD OR MILK AS NEEDED TABLET, FILM COATED ORAL
Status: ACTIVE | COMMUNITY

## 2023-12-18 RX ORDER — LINACLOTIDE 72 UG/1
1 CAPSULE AT LEAST 30 MINUTES BEFORE THE FIRST MEAL OF THE DAY ON AN EMPTY STOMACH CAPSULE, GELATIN COATED ORAL ONCE A DAY
Qty: 90 | Refills: 3 | Status: ACTIVE | COMMUNITY

## 2023-12-18 RX ORDER — ONDANSETRON 8 MG/1
1 TABLET ON THE TONGUE AND ALLOW TO DISSOLVE AS NEEDED TABLET, ORALLY DISINTEGRATING ORAL
Qty: 30 | Refills: 5 | Status: ACTIVE | COMMUNITY
Start: 2023-10-27

## 2023-12-18 RX ORDER — DIPHENHYDRAMINE HYDROCHLORIDE 50 MG/ML
25 MG INJECTION INTRAMUSCULAR; INTRAVENOUS
Qty: 25 MG | Refills: 11 | Status: ACTIVE | COMMUNITY
Start: 2021-12-21

## 2023-12-18 RX ORDER — HYDROCORTISONE SODIUM SUCCINATE 100 MG/2ML
200 MG INJECTION, POWDER, FOR SOLUTION INTRAMUSCULAR; INTRAVENOUS
Status: ACTIVE | COMMUNITY
Start: 2020-10-21

## 2023-12-18 RX ORDER — DIPHENHYDRAMINE HYDROCHLORIDE 50 MG/ML
50 MG INJECTION INTRAMUSCULAR; INTRAVENOUS
Status: ACTIVE | COMMUNITY
Start: 2020-10-21

## 2023-12-28 ENCOUNTER — OFFICE VISIT (OUTPATIENT)
Dept: URBAN - METROPOLITAN AREA SURGERY CENTER 16 | Facility: SURGERY CENTER | Age: 43
End: 2023-12-28
Payer: COMMERCIAL

## 2023-12-28 DIAGNOSIS — K62.89 OTHER SPECIFIED DISEASES OF ANUS AND RECTUM: ICD-10-CM

## 2023-12-28 DIAGNOSIS — K64.8 EXTERNAL HEMORRHOIDS: ICD-10-CM

## 2023-12-28 DIAGNOSIS — K62.89 ACUTE PROCTITIS: ICD-10-CM

## 2023-12-28 DIAGNOSIS — K51.00 ACUTE ULCERATIVE PANCOLITIS: ICD-10-CM

## 2023-12-28 DIAGNOSIS — K51.90 ACUTE ULCERATIVE COLITIS: ICD-10-CM

## 2023-12-28 DIAGNOSIS — K63.89 APPENDICITIS EPIPLOICA: ICD-10-CM

## 2023-12-28 PROCEDURE — 45380 COLONOSCOPY AND BIOPSY: CPT | Performed by: INTERNAL MEDICINE

## 2023-12-28 PROCEDURE — G8907 PT DOC NO EVENTS ON DISCHARG: HCPCS | Performed by: INTERNAL MEDICINE

## 2023-12-28 PROCEDURE — 00811 ANES LWR INTST NDSC NOS: CPT | Performed by: PHYSICIAN ASSISTANT

## 2023-12-28 PROCEDURE — 00811 ANES LWR INTST NDSC NOS: CPT | Performed by: ANESTHESIOLOGY

## 2024-01-09 ENCOUNTER — OFFICE VISIT (OUTPATIENT)
Dept: URBAN - METROPOLITAN AREA CLINIC 97 | Facility: CLINIC | Age: 44
End: 2024-01-09

## 2024-01-12 ENCOUNTER — OFFICE VISIT (OUTPATIENT)
Dept: URBAN - METROPOLITAN AREA CLINIC 97 | Facility: CLINIC | Age: 44
End: 2024-01-12
Payer: COMMERCIAL

## 2024-01-12 VITALS
TEMPERATURE: 98.1 F | SYSTOLIC BLOOD PRESSURE: 116 MMHG | RESPIRATION RATE: 18 BRPM | HEIGHT: 67 IN | BODY MASS INDEX: 30.61 KG/M2 | DIASTOLIC BLOOD PRESSURE: 68 MMHG | WEIGHT: 195 LBS | HEART RATE: 78 BPM

## 2024-01-12 DIAGNOSIS — K51.80 ULCERATIVE COLITIS: ICD-10-CM

## 2024-01-12 PROCEDURE — 96413 CHEMO IV INFUSION 1 HR: CPT | Performed by: INTERNAL MEDICINE

## 2024-01-12 PROCEDURE — 96375 TX/PRO/DX INJ NEW DRUG ADDON: CPT | Performed by: INTERNAL MEDICINE

## 2024-01-12 PROCEDURE — 96415 CHEMO IV INFUSION ADDL HR: CPT | Performed by: INTERNAL MEDICINE

## 2024-01-12 RX ORDER — LINACLOTIDE 72 UG/1
1 CAPSULE AT LEAST 30 MINUTES BEFORE THE FIRST MEAL OF THE DAY ON AN EMPTY STOMACH CAPSULE, GELATIN COATED ORAL ONCE A DAY
Qty: 90 | Refills: 3 | Status: ACTIVE | COMMUNITY

## 2024-01-12 RX ORDER — ONDANSETRON 2 MG/ML
4 MG INJECTION, SOLUTION INTRAMUSCULAR; INTRAVENOUS
Status: ACTIVE | COMMUNITY
Start: 2021-11-19

## 2024-01-12 RX ORDER — HYDROCORTISONE SODIUM SUCCINATE 100 MG/2ML
200 MG INJECTION, POWDER, FOR SOLUTION INTRAMUSCULAR; INTRAVENOUS
Status: ACTIVE | COMMUNITY
Start: 2020-10-21

## 2024-01-12 RX ORDER — DIPHENHYDRAMINE HYDROCHLORIDE 50 MG/ML
25 MG INJECTION INTRAMUSCULAR; INTRAVENOUS
Qty: 25 MG | Refills: 11 | Status: ACTIVE | COMMUNITY
Start: 2021-12-21

## 2024-01-12 RX ORDER — DIPHENHYDRAMINE HYDROCHLORIDE 50 MG/ML
50 MG INJECTION INTRAMUSCULAR; INTRAVENOUS
Status: ACTIVE | COMMUNITY
Start: 2020-10-21

## 2024-01-12 RX ORDER — IBUPROFEN 800 MG/1
1 TABLET WITH FOOD OR MILK AS NEEDED TABLET, FILM COATED ORAL
Status: ACTIVE | COMMUNITY

## 2024-01-12 RX ORDER — ONDANSETRON 8 MG/1
1 TABLET ON THE TONGUE AND ALLOW TO DISSOLVE AS NEEDED TABLET, ORALLY DISINTEGRATING ORAL
Qty: 30 | Refills: 5 | Status: ACTIVE | COMMUNITY
Start: 2023-10-27

## 2024-02-06 ENCOUNTER — REM (OUTPATIENT)
Dept: URBAN - METROPOLITAN AREA CLINIC 97 | Facility: CLINIC | Age: 44
End: 2024-02-06

## 2024-02-09 ENCOUNTER — REM (OUTPATIENT)
Dept: URBAN - METROPOLITAN AREA CLINIC 97 | Facility: CLINIC | Age: 44
End: 2024-02-09
Payer: COMMERCIAL

## 2024-02-09 VITALS
DIASTOLIC BLOOD PRESSURE: 74 MMHG | HEART RATE: 79 BPM | WEIGHT: 200 LBS | HEIGHT: 67 IN | BODY MASS INDEX: 31.39 KG/M2 | RESPIRATION RATE: 18 BRPM | SYSTOLIC BLOOD PRESSURE: 111 MMHG | TEMPERATURE: 98.1 F

## 2024-02-09 DIAGNOSIS — K51.80 CHRONIC PANCOLONIC ULCERATIVE COLITIS: ICD-10-CM

## 2024-02-09 PROCEDURE — 96413 CHEMO IV INFUSION 1 HR: CPT | Performed by: INTERNAL MEDICINE

## 2024-02-09 PROCEDURE — 96415 CHEMO IV INFUSION ADDL HR: CPT | Performed by: INTERNAL MEDICINE

## 2024-02-09 PROCEDURE — 96375 TX/PRO/DX INJ NEW DRUG ADDON: CPT | Performed by: INTERNAL MEDICINE

## 2024-02-09 RX ORDER — LINACLOTIDE 72 UG/1
1 CAPSULE AT LEAST 30 MINUTES BEFORE THE FIRST MEAL OF THE DAY ON AN EMPTY STOMACH CAPSULE, GELATIN COATED ORAL ONCE A DAY
Qty: 90 | Refills: 3 | Status: ACTIVE | COMMUNITY

## 2024-02-09 RX ORDER — IBUPROFEN 800 MG/1
1 TABLET WITH FOOD OR MILK AS NEEDED TABLET, FILM COATED ORAL
Status: ACTIVE | COMMUNITY

## 2024-02-09 RX ORDER — ONDANSETRON 8 MG/1
1 TABLET ON THE TONGUE AND ALLOW TO DISSOLVE AS NEEDED TABLET, ORALLY DISINTEGRATING ORAL
Qty: 30 | Refills: 5 | Status: ACTIVE | COMMUNITY
Start: 2023-10-27

## 2024-02-09 RX ORDER — HYDROCORTISONE SODIUM SUCCINATE 100 MG/2ML
200 MG INJECTION, POWDER, FOR SOLUTION INTRAMUSCULAR; INTRAVENOUS
Status: ACTIVE | COMMUNITY
Start: 2020-10-21

## 2024-02-09 RX ORDER — DIPHENHYDRAMINE HYDROCHLORIDE 50 MG/ML
25 MG INJECTION INTRAMUSCULAR; INTRAVENOUS
Qty: 25 MG | Refills: 11 | Status: ACTIVE | COMMUNITY
Start: 2021-12-21

## 2024-02-09 RX ORDER — DIPHENHYDRAMINE HYDROCHLORIDE 50 MG/ML
50 MG INJECTION INTRAMUSCULAR; INTRAVENOUS
Status: ACTIVE | COMMUNITY
Start: 2020-10-21

## 2024-02-09 RX ORDER — ONDANSETRON 2 MG/ML
4 MG INJECTION, SOLUTION INTRAMUSCULAR; INTRAVENOUS
Status: ACTIVE | COMMUNITY
Start: 2021-11-19

## 2024-03-08 ENCOUNTER — REM (OUTPATIENT)
Dept: URBAN - METROPOLITAN AREA CLINIC 97 | Facility: CLINIC | Age: 44
End: 2024-03-08
Payer: COMMERCIAL

## 2024-03-08 VITALS
DIASTOLIC BLOOD PRESSURE: 70 MMHG | BODY MASS INDEX: 31.86 KG/M2 | WEIGHT: 203 LBS | RESPIRATION RATE: 18 BRPM | HEART RATE: 76 BPM | SYSTOLIC BLOOD PRESSURE: 123 MMHG | TEMPERATURE: 98.1 F | HEIGHT: 67 IN

## 2024-03-08 DIAGNOSIS — K51.80 CHRONIC PANCOLONIC ULCERATIVE COLITIS: ICD-10-CM

## 2024-03-08 PROCEDURE — 96375 TX/PRO/DX INJ NEW DRUG ADDON: CPT | Performed by: INTERNAL MEDICINE

## 2024-03-08 PROCEDURE — 96413 CHEMO IV INFUSION 1 HR: CPT | Performed by: INTERNAL MEDICINE

## 2024-03-08 PROCEDURE — 96415 CHEMO IV INFUSION ADDL HR: CPT | Performed by: INTERNAL MEDICINE

## 2024-03-08 RX ORDER — HYDROCORTISONE SODIUM SUCCINATE 100 MG/2ML
200 MG INJECTION, POWDER, FOR SOLUTION INTRAMUSCULAR; INTRAVENOUS
Status: ACTIVE | COMMUNITY
Start: 2020-10-21

## 2024-03-08 RX ORDER — ONDANSETRON 2 MG/ML
4 MG INJECTION, SOLUTION INTRAMUSCULAR; INTRAVENOUS
Status: ACTIVE | COMMUNITY
Start: 2021-11-19

## 2024-03-08 RX ORDER — INFLIXIMAB 100 MG/10ML
AS DIRECTED INJECTION, POWDER, LYOPHILIZED, FOR SOLUTION INTRAVENOUS
Status: ACTIVE | COMMUNITY
Start: 2024-03-06

## 2024-03-08 RX ORDER — LINACLOTIDE 72 UG/1
1 CAPSULE AT LEAST 30 MINUTES BEFORE THE FIRST MEAL OF THE DAY ON AN EMPTY STOMACH CAPSULE, GELATIN COATED ORAL ONCE A DAY
Qty: 90 | Refills: 3 | Status: ACTIVE | COMMUNITY

## 2024-03-08 RX ORDER — IBUPROFEN 800 MG/1
1 TABLET WITH FOOD OR MILK AS NEEDED TABLET, FILM COATED ORAL
Status: ACTIVE | COMMUNITY

## 2024-03-08 RX ORDER — DIPHENHYDRAMINE HYDROCHLORIDE 50 MG/ML
25 MG INJECTION INTRAMUSCULAR; INTRAVENOUS
Qty: 25 MG | Refills: 11 | Status: ACTIVE | COMMUNITY
Start: 2021-12-21

## 2024-03-08 RX ORDER — DIPHENHYDRAMINE HYDROCHLORIDE 50 MG/ML
50 MG INJECTION INTRAMUSCULAR; INTRAVENOUS
Status: ACTIVE | COMMUNITY
Start: 2020-10-21

## 2024-03-08 RX ORDER — ONDANSETRON 8 MG/1
1 TABLET ON THE TONGUE AND ALLOW TO DISSOLVE AS NEEDED TABLET, ORALLY DISINTEGRATING ORAL
Qty: 30 | Refills: 5 | Status: ACTIVE | COMMUNITY
Start: 2023-10-27

## 2024-04-05 ENCOUNTER — REM (OUTPATIENT)
Dept: URBAN - METROPOLITAN AREA CLINIC 97 | Facility: CLINIC | Age: 44
End: 2024-04-05
Payer: COMMERCIAL

## 2024-04-05 VITALS
DIASTOLIC BLOOD PRESSURE: 66 MMHG | TEMPERATURE: 98.1 F | SYSTOLIC BLOOD PRESSURE: 124 MMHG | HEART RATE: 74 BPM | WEIGHT: 201 LBS | HEIGHT: 67 IN | BODY MASS INDEX: 31.55 KG/M2 | RESPIRATION RATE: 18 BRPM

## 2024-04-05 DIAGNOSIS — K51.80 CHRONIC PANCOLONIC ULCERATIVE COLITIS: ICD-10-CM

## 2024-04-05 PROCEDURE — 96375 TX/PRO/DX INJ NEW DRUG ADDON: CPT | Performed by: INTERNAL MEDICINE

## 2024-04-05 PROCEDURE — 96413 CHEMO IV INFUSION 1 HR: CPT | Performed by: INTERNAL MEDICINE

## 2024-04-05 PROCEDURE — 96415 CHEMO IV INFUSION ADDL HR: CPT | Performed by: INTERNAL MEDICINE

## 2024-04-05 RX ORDER — DIPHENHYDRAMINE HYDROCHLORIDE 50 MG/ML
50 MG INJECTION INTRAMUSCULAR; INTRAVENOUS
Status: ACTIVE | COMMUNITY
Start: 2020-10-21

## 2024-04-05 RX ORDER — LINACLOTIDE 72 UG/1
1 CAPSULE AT LEAST 30 MINUTES BEFORE THE FIRST MEAL OF THE DAY ON AN EMPTY STOMACH CAPSULE, GELATIN COATED ORAL ONCE A DAY
Qty: 90 | Refills: 3 | Status: ACTIVE | COMMUNITY

## 2024-04-05 RX ORDER — ONDANSETRON 2 MG/ML
4 MG INJECTION, SOLUTION INTRAMUSCULAR; INTRAVENOUS
Status: ACTIVE | COMMUNITY
Start: 2021-11-19

## 2024-04-05 RX ORDER — ONDANSETRON 8 MG/1
1 TABLET ON THE TONGUE AND ALLOW TO DISSOLVE AS NEEDED TABLET, ORALLY DISINTEGRATING ORAL
Qty: 30 | Refills: 5 | Status: ACTIVE | COMMUNITY
Start: 2023-10-27

## 2024-04-05 RX ORDER — HYDROCORTISONE SODIUM SUCCINATE 100 MG/2ML
200 MG INJECTION, POWDER, FOR SOLUTION INTRAMUSCULAR; INTRAVENOUS
Status: ACTIVE | COMMUNITY
Start: 2020-10-21

## 2024-04-05 RX ORDER — IBUPROFEN 800 MG/1
1 TABLET WITH FOOD OR MILK AS NEEDED TABLET, FILM COATED ORAL
Status: ACTIVE | COMMUNITY

## 2024-04-05 RX ORDER — INFLIXIMAB 100 MG/10ML
AS DIRECTED INJECTION, POWDER, LYOPHILIZED, FOR SOLUTION INTRAVENOUS
Status: ACTIVE | COMMUNITY
Start: 2024-03-06

## 2024-04-05 RX ORDER — DIPHENHYDRAMINE HYDROCHLORIDE 50 MG/ML
25 MG INJECTION INTRAMUSCULAR; INTRAVENOUS
Qty: 25 MG | Refills: 11 | Status: ACTIVE | COMMUNITY
Start: 2021-12-21

## 2024-05-03 ENCOUNTER — OFFICE VISIT (OUTPATIENT)
Dept: URBAN - METROPOLITAN AREA CLINIC 97 | Facility: CLINIC | Age: 44
End: 2024-05-03
Payer: COMMERCIAL

## 2024-05-03 VITALS
RESPIRATION RATE: 18 BRPM | DIASTOLIC BLOOD PRESSURE: 93 MMHG | HEART RATE: 93 BPM | BODY MASS INDEX: 30.76 KG/M2 | WEIGHT: 196 LBS | HEIGHT: 67 IN | TEMPERATURE: 97.9 F | SYSTOLIC BLOOD PRESSURE: 131 MMHG

## 2024-05-03 DIAGNOSIS — K51.80 CHRONIC PANCOLONIC ULCERATIVE COLITIS: ICD-10-CM

## 2024-05-03 PROCEDURE — 96375 TX/PRO/DX INJ NEW DRUG ADDON: CPT | Performed by: INTERNAL MEDICINE

## 2024-05-03 PROCEDURE — 96413 CHEMO IV INFUSION 1 HR: CPT | Performed by: INTERNAL MEDICINE

## 2024-05-03 RX ORDER — INFLIXIMAB 100 MG/10ML
AS DIRECTED INJECTION, POWDER, LYOPHILIZED, FOR SOLUTION INTRAVENOUS
Status: ACTIVE | COMMUNITY
Start: 2024-03-06

## 2024-05-03 RX ORDER — IBUPROFEN 800 MG/1
1 TABLET WITH FOOD OR MILK AS NEEDED TABLET, FILM COATED ORAL
Status: ACTIVE | COMMUNITY

## 2024-05-03 RX ORDER — LINACLOTIDE 72 UG/1
1 CAPSULE AT LEAST 30 MINUTES BEFORE THE FIRST MEAL OF THE DAY ON AN EMPTY STOMACH CAPSULE, GELATIN COATED ORAL ONCE A DAY
Qty: 90 | Refills: 3 | Status: ACTIVE | COMMUNITY

## 2024-05-03 RX ORDER — DIPHENHYDRAMINE HYDROCHLORIDE 50 MG/ML
50 MG INJECTION INTRAMUSCULAR; INTRAVENOUS
Status: ACTIVE | COMMUNITY
Start: 2020-10-21

## 2024-05-03 RX ORDER — ONDANSETRON 8 MG/1
1 TABLET ON THE TONGUE AND ALLOW TO DISSOLVE AS NEEDED TABLET, ORALLY DISINTEGRATING ORAL
Qty: 30 | Refills: 5 | Status: ACTIVE | COMMUNITY
Start: 2023-10-27

## 2024-05-03 RX ORDER — DIPHENHYDRAMINE HYDROCHLORIDE 50 MG/ML
25 MG INJECTION INTRAMUSCULAR; INTRAVENOUS
Qty: 25 MG | Refills: 11 | Status: ACTIVE | COMMUNITY
Start: 2021-12-21

## 2024-05-03 RX ORDER — ONDANSETRON 2 MG/ML
4 MG INJECTION, SOLUTION INTRAMUSCULAR; INTRAVENOUS
Status: ACTIVE | COMMUNITY
Start: 2021-11-19

## 2024-05-03 RX ORDER — HYDROCORTISONE SODIUM SUCCINATE 100 MG/2ML
200 MG INJECTION, POWDER, FOR SOLUTION INTRAMUSCULAR; INTRAVENOUS
Status: ACTIVE | COMMUNITY
Start: 2020-10-21

## 2024-05-30 ENCOUNTER — OFFICE VISIT (OUTPATIENT)
Dept: URBAN - METROPOLITAN AREA CLINIC 97 | Facility: CLINIC | Age: 44
End: 2024-05-30
Payer: COMMERCIAL

## 2024-05-30 VITALS
RESPIRATION RATE: 18 BRPM | WEIGHT: 190 LBS | HEIGHT: 67 IN | BODY MASS INDEX: 29.82 KG/M2 | SYSTOLIC BLOOD PRESSURE: 109 MMHG | TEMPERATURE: 97.9 F | HEART RATE: 94 BPM | DIASTOLIC BLOOD PRESSURE: 73 MMHG

## 2024-05-30 DIAGNOSIS — K51.80 CHRONIC PANCOLONIC ULCERATIVE COLITIS: ICD-10-CM

## 2024-05-30 PROCEDURE — 96415 CHEMO IV INFUSION ADDL HR: CPT | Performed by: INTERNAL MEDICINE

## 2024-05-30 PROCEDURE — 96375 TX/PRO/DX INJ NEW DRUG ADDON: CPT | Performed by: INTERNAL MEDICINE

## 2024-05-30 PROCEDURE — 96413 CHEMO IV INFUSION 1 HR: CPT | Performed by: INTERNAL MEDICINE

## 2024-05-30 RX ORDER — ONDANSETRON 2 MG/ML
4 MG INJECTION, SOLUTION INTRAMUSCULAR; INTRAVENOUS
Status: ACTIVE | COMMUNITY
Start: 2021-11-19

## 2024-05-30 RX ORDER — DIPHENHYDRAMINE HYDROCHLORIDE 50 MG/ML
50 MG INJECTION INTRAMUSCULAR; INTRAVENOUS
Status: ACTIVE | COMMUNITY
Start: 2020-10-21

## 2024-05-30 RX ORDER — DIPHENHYDRAMINE HYDROCHLORIDE 50 MG/ML
25 MG INJECTION INTRAMUSCULAR; INTRAVENOUS
Qty: 25 MG | Refills: 11 | Status: ACTIVE | COMMUNITY
Start: 2021-12-21

## 2024-05-30 RX ORDER — IBUPROFEN 800 MG/1
1 TABLET WITH FOOD OR MILK AS NEEDED TABLET, FILM COATED ORAL
Status: ACTIVE | COMMUNITY

## 2024-05-30 RX ORDER — HYDROCORTISONE SODIUM SUCCINATE 100 MG/2ML
200 MG INJECTION, POWDER, FOR SOLUTION INTRAMUSCULAR; INTRAVENOUS
Status: ACTIVE | COMMUNITY
Start: 2020-10-21

## 2024-05-30 RX ORDER — INFLIXIMAB 100 MG/10ML
AS DIRECTED INJECTION, POWDER, LYOPHILIZED, FOR SOLUTION INTRAVENOUS
Status: ACTIVE | COMMUNITY
Start: 2024-03-06

## 2024-05-30 RX ORDER — ONDANSETRON 8 MG/1
1 TABLET ON THE TONGUE AND ALLOW TO DISSOLVE AS NEEDED TABLET, ORALLY DISINTEGRATING ORAL
Qty: 30 | Refills: 5 | Status: ACTIVE | COMMUNITY
Start: 2023-10-27

## 2024-05-30 RX ORDER — LINACLOTIDE 72 UG/1
1 CAPSULE AT LEAST 30 MINUTES BEFORE THE FIRST MEAL OF THE DAY ON AN EMPTY STOMACH CAPSULE, GELATIN COATED ORAL ONCE A DAY
Qty: 90 | Refills: 3 | Status: ACTIVE | COMMUNITY

## 2024-06-27 ENCOUNTER — TELEPHONE ENCOUNTER (OUTPATIENT)
Dept: URBAN - METROPOLITAN AREA CLINIC 105 | Facility: CLINIC | Age: 44
End: 2024-06-27

## 2024-06-27 ENCOUNTER — OFFICE VISIT (OUTPATIENT)
Dept: URBAN - METROPOLITAN AREA CLINIC 97 | Facility: CLINIC | Age: 44
End: 2024-06-27
Payer: COMMERCIAL

## 2024-06-27 VITALS
DIASTOLIC BLOOD PRESSURE: 76 MMHG | SYSTOLIC BLOOD PRESSURE: 110 MMHG | RESPIRATION RATE: 18 BRPM | BODY MASS INDEX: 29.82 KG/M2 | HEART RATE: 81 BPM | HEIGHT: 67 IN | WEIGHT: 190 LBS | TEMPERATURE: 98 F

## 2024-06-27 DIAGNOSIS — K51.80 ULCERATIVE COLITIS: ICD-10-CM

## 2024-06-27 PROCEDURE — 96375 TX/PRO/DX INJ NEW DRUG ADDON: CPT | Performed by: INTERNAL MEDICINE

## 2024-06-27 PROCEDURE — 96415 CHEMO IV INFUSION ADDL HR: CPT | Performed by: INTERNAL MEDICINE

## 2024-06-27 PROCEDURE — 96413 CHEMO IV INFUSION 1 HR: CPT | Performed by: INTERNAL MEDICINE

## 2024-06-27 RX ORDER — ONDANSETRON 2 MG/ML
4 MG INJECTION, SOLUTION INTRAMUSCULAR; INTRAVENOUS
Status: ACTIVE | COMMUNITY
Start: 2021-11-19

## 2024-06-27 RX ORDER — DIPHENHYDRAMINE HYDROCHLORIDE 50 MG/ML
50 MG INJECTION INTRAMUSCULAR; INTRAVENOUS
Status: ACTIVE | COMMUNITY
Start: 2020-10-21

## 2024-06-27 RX ORDER — ONDANSETRON 8 MG/1
1 TABLET ON THE TONGUE AND ALLOW TO DISSOLVE AS NEEDED TABLET, ORALLY DISINTEGRATING ORAL
Qty: 30 | Refills: 5 | Status: ACTIVE | COMMUNITY
Start: 2023-10-27

## 2024-06-27 RX ORDER — DIPHENHYDRAMINE HYDROCHLORIDE 50 MG/ML
25 MG INJECTION INTRAMUSCULAR; INTRAVENOUS
Qty: 25 MG | Refills: 11 | Status: ACTIVE | COMMUNITY
Start: 2021-12-21

## 2024-06-27 RX ORDER — INFLIXIMAB 100 MG/10ML
AS DIRECTED INJECTION, POWDER, LYOPHILIZED, FOR SOLUTION INTRAVENOUS
Status: ACTIVE | COMMUNITY
Start: 2024-03-06

## 2024-06-27 RX ORDER — LINACLOTIDE 72 UG/1
1 CAPSULE AT LEAST 30 MINUTES BEFORE THE FIRST MEAL OF THE DAY ON AN EMPTY STOMACH CAPSULE, GELATIN COATED ORAL ONCE A DAY
Qty: 90 | Refills: 3 | Status: ACTIVE | COMMUNITY

## 2024-06-27 RX ORDER — HYDROCORTISONE SODIUM SUCCINATE 100 MG/2ML
200 MG INJECTION, POWDER, FOR SOLUTION INTRAMUSCULAR; INTRAVENOUS
Status: ACTIVE | COMMUNITY
Start: 2020-10-21

## 2024-06-27 RX ORDER — IBUPROFEN 800 MG/1
1 TABLET WITH FOOD OR MILK AS NEEDED TABLET, FILM COATED ORAL
Status: ACTIVE | COMMUNITY

## 2024-06-28 ENCOUNTER — OFFICE VISIT (OUTPATIENT)
Dept: URBAN - METROPOLITAN AREA CLINIC 97 | Facility: CLINIC | Age: 44
End: 2024-06-28

## 2024-07-19 ENCOUNTER — OFFICE VISIT (OUTPATIENT)
Dept: URBAN - METROPOLITAN AREA CLINIC 105 | Facility: CLINIC | Age: 44
End: 2024-07-19
Payer: COMMERCIAL

## 2024-07-19 VITALS
BODY MASS INDEX: 30.32 KG/M2 | TEMPERATURE: 98.2 F | RESPIRATION RATE: 18 BRPM | HEIGHT: 67 IN | HEART RATE: 101 BPM | WEIGHT: 193.2 LBS | SYSTOLIC BLOOD PRESSURE: 128 MMHG | DIASTOLIC BLOOD PRESSURE: 79 MMHG

## 2024-07-19 DIAGNOSIS — E55.9 VITAMIN D DEFICIENCY: ICD-10-CM

## 2024-07-19 DIAGNOSIS — D50.8 OTHER IRON DEFICIENCY ANEMIA: ICD-10-CM

## 2024-07-19 DIAGNOSIS — K51.919 ULCERATIVE COLITIS WITH COMPLICATION: ICD-10-CM

## 2024-07-19 PROCEDURE — 99214 OFFICE O/P EST MOD 30 MIN: CPT | Performed by: INTERNAL MEDICINE

## 2024-07-19 RX ORDER — DIPHENHYDRAMINE HYDROCHLORIDE 50 MG/ML
25 MG INJECTION INTRAMUSCULAR; INTRAVENOUS
Qty: 25 MG | Refills: 11 | Status: ACTIVE | COMMUNITY
Start: 2021-12-21

## 2024-07-19 RX ORDER — ONDANSETRON 2 MG/ML
4 MG INJECTION, SOLUTION INTRAMUSCULAR; INTRAVENOUS
Status: ACTIVE | COMMUNITY
Start: 2021-11-19

## 2024-07-19 RX ORDER — LINACLOTIDE 72 UG/1
1 CAPSULE AT LEAST 30 MINUTES BEFORE THE FIRST MEAL OF THE DAY ON AN EMPTY STOMACH CAPSULE, GELATIN COATED ORAL ONCE A DAY
Qty: 90 | Refills: 3 | Status: ACTIVE | COMMUNITY

## 2024-07-19 RX ORDER — HYDROCORTISONE SODIUM SUCCINATE 100 MG/2ML
200 MG INJECTION, POWDER, FOR SOLUTION INTRAMUSCULAR; INTRAVENOUS
Status: ACTIVE | COMMUNITY
Start: 2020-10-21

## 2024-07-19 RX ORDER — INFLIXIMAB 100 MG/10ML
AS DIRECTED INJECTION, POWDER, LYOPHILIZED, FOR SOLUTION INTRAVENOUS
Status: ACTIVE | COMMUNITY
Start: 2024-03-06

## 2024-07-19 RX ORDER — DIPHENHYDRAMINE HYDROCHLORIDE 50 MG/ML
50 MG INJECTION INTRAMUSCULAR; INTRAVENOUS
Status: ACTIVE | COMMUNITY
Start: 2020-10-21

## 2024-07-19 RX ORDER — IBUPROFEN 800 MG/1
1 TABLET WITH FOOD OR MILK AS NEEDED TABLET, FILM COATED ORAL
Status: ACTIVE | COMMUNITY

## 2024-07-19 RX ORDER — ONDANSETRON 8 MG/1
1 TABLET ON THE TONGUE AND ALLOW TO DISSOLVE AS NEEDED TABLET, ORALLY DISINTEGRATING ORAL
Qty: 30 | Refills: 5 | Status: ACTIVE | COMMUNITY
Start: 2023-10-27
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
## 2024-07-26 ENCOUNTER — OFFICE VISIT (OUTPATIENT)
Dept: URBAN - METROPOLITAN AREA CLINIC 97 | Facility: CLINIC | Age: 44
End: 2024-07-26
Payer: COMMERCIAL

## 2024-07-26 VITALS
HEART RATE: 90 BPM | RESPIRATION RATE: 18 BRPM | TEMPERATURE: 98.4 F | WEIGHT: 192.6 LBS | DIASTOLIC BLOOD PRESSURE: 105 MMHG | SYSTOLIC BLOOD PRESSURE: 161 MMHG | HEIGHT: 67 IN | BODY MASS INDEX: 30.23 KG/M2

## 2024-07-26 DIAGNOSIS — K51.80 CHRONIC PANCOLONIC ULCERATIVE COLITIS: ICD-10-CM

## 2024-07-26 PROCEDURE — 96375 TX/PRO/DX INJ NEW DRUG ADDON: CPT | Performed by: INTERNAL MEDICINE

## 2024-07-26 PROCEDURE — 96413 CHEMO IV INFUSION 1 HR: CPT | Performed by: INTERNAL MEDICINE

## 2024-07-26 PROCEDURE — 96415 CHEMO IV INFUSION ADDL HR: CPT | Performed by: INTERNAL MEDICINE

## 2024-07-26 RX ORDER — IBUPROFEN 800 MG/1
1 TABLET WITH FOOD OR MILK AS NEEDED TABLET, FILM COATED ORAL
COMMUNITY

## 2024-07-26 RX ORDER — ONDANSETRON 8 MG/1
1 TABLET ON THE TONGUE AND ALLOW TO DISSOLVE AS NEEDED TABLET, ORALLY DISINTEGRATING ORAL
Qty: 30 | Refills: 5 | COMMUNITY
Start: 2023-10-27

## 2024-07-26 RX ORDER — INFLIXIMAB 100 MG/10ML
AS DIRECTED INJECTION, POWDER, LYOPHILIZED, FOR SOLUTION INTRAVENOUS
COMMUNITY
Start: 2024-03-06

## 2024-07-26 RX ORDER — HYDROCORTISONE SODIUM SUCCINATE 100 MG/2ML
200 MG INJECTION, POWDER, FOR SOLUTION INTRAMUSCULAR; INTRAVENOUS
COMMUNITY
Start: 2020-10-21

## 2024-07-26 RX ORDER — DIPHENHYDRAMINE HYDROCHLORIDE 50 MG/ML
50 MG INJECTION INTRAMUSCULAR; INTRAVENOUS
COMMUNITY
Start: 2020-10-21

## 2024-07-26 RX ORDER — ONDANSETRON 2 MG/ML
4 MG INJECTION, SOLUTION INTRAMUSCULAR; INTRAVENOUS
COMMUNITY
Start: 2021-11-19

## 2024-07-26 RX ORDER — DIPHENHYDRAMINE HYDROCHLORIDE 50 MG/ML
25 MG INJECTION INTRAMUSCULAR; INTRAVENOUS
Qty: 25 MG | Refills: 11 | COMMUNITY
Start: 2021-12-21

## 2024-07-26 RX ORDER — LINACLOTIDE 72 UG/1
1 CAPSULE AT LEAST 30 MINUTES BEFORE THE FIRST MEAL OF THE DAY ON AN EMPTY STOMACH CAPSULE, GELATIN COATED ORAL ONCE A DAY
Qty: 90 | Refills: 3 | COMMUNITY

## 2024-08-23 ENCOUNTER — OFFICE VISIT (OUTPATIENT)
Dept: URBAN - METROPOLITAN AREA CLINIC 97 | Facility: CLINIC | Age: 44
End: 2024-08-23
Payer: COMMERCIAL

## 2024-08-23 VITALS
RESPIRATION RATE: 17 BRPM | HEIGHT: 67 IN | SYSTOLIC BLOOD PRESSURE: 143 MMHG | WEIGHT: 196.6 LBS | DIASTOLIC BLOOD PRESSURE: 93 MMHG | HEART RATE: 92 BPM | TEMPERATURE: 98.5 F | BODY MASS INDEX: 30.86 KG/M2

## 2024-08-23 DIAGNOSIS — K51.80 ULCERATIVE COLITIS: ICD-10-CM

## 2024-08-23 PROCEDURE — 96413 CHEMO IV INFUSION 1 HR: CPT | Performed by: INTERNAL MEDICINE

## 2024-08-23 PROCEDURE — 96375 TX/PRO/DX INJ NEW DRUG ADDON: CPT | Performed by: INTERNAL MEDICINE

## 2024-08-23 PROCEDURE — 96415 CHEMO IV INFUSION ADDL HR: CPT | Performed by: INTERNAL MEDICINE

## 2024-08-23 RX ORDER — INFLIXIMAB 100 MG/10ML
AS DIRECTED INJECTION, POWDER, LYOPHILIZED, FOR SOLUTION INTRAVENOUS
COMMUNITY
Start: 2024-03-06

## 2024-08-23 RX ORDER — ONDANSETRON 2 MG/ML
4 MG INJECTION, SOLUTION INTRAMUSCULAR; INTRAVENOUS
COMMUNITY
Start: 2021-11-19

## 2024-08-23 RX ORDER — HYDROCORTISONE SODIUM SUCCINATE 100 MG/2ML
200 MG INJECTION, POWDER, FOR SOLUTION INTRAMUSCULAR; INTRAVENOUS
COMMUNITY
Start: 2020-10-21

## 2024-08-23 RX ORDER — ONDANSETRON 8 MG/1
1 TABLET ON THE TONGUE AND ALLOW TO DISSOLVE AS NEEDED TABLET, ORALLY DISINTEGRATING ORAL
Qty: 30 | Refills: 5 | COMMUNITY
Start: 2023-10-27

## 2024-08-23 RX ORDER — DIPHENHYDRAMINE HYDROCHLORIDE 50 MG/ML
50 MG INJECTION INTRAMUSCULAR; INTRAVENOUS
COMMUNITY
Start: 2020-10-21

## 2024-08-23 RX ORDER — DIPHENHYDRAMINE HYDROCHLORIDE 50 MG/ML
25 MG INJECTION INTRAMUSCULAR; INTRAVENOUS
Qty: 25 MG | Refills: 11 | COMMUNITY
Start: 2021-12-21

## 2024-08-23 RX ORDER — LINACLOTIDE 72 UG/1
1 CAPSULE AT LEAST 30 MINUTES BEFORE THE FIRST MEAL OF THE DAY ON AN EMPTY STOMACH CAPSULE, GELATIN COATED ORAL ONCE A DAY
Qty: 90 | Refills: 3 | COMMUNITY

## 2024-08-23 RX ORDER — IBUPROFEN 800 MG/1
1 TABLET WITH FOOD OR MILK AS NEEDED TABLET, FILM COATED ORAL
COMMUNITY

## 2024-09-20 ENCOUNTER — OFFICE VISIT (OUTPATIENT)
Dept: URBAN - METROPOLITAN AREA CLINIC 97 | Facility: CLINIC | Age: 44
End: 2024-09-20

## 2024-09-20 VITALS
DIASTOLIC BLOOD PRESSURE: 92 MMHG | BODY MASS INDEX: 31.01 KG/M2 | SYSTOLIC BLOOD PRESSURE: 138 MMHG | WEIGHT: 197.6 LBS | TEMPERATURE: 97.3 F | HEIGHT: 67 IN

## 2024-09-20 RX ORDER — LINACLOTIDE 72 UG/1
1 CAPSULE AT LEAST 30 MINUTES BEFORE THE FIRST MEAL OF THE DAY ON AN EMPTY STOMACH CAPSULE, GELATIN COATED ORAL ONCE A DAY
Qty: 90 | Refills: 3 | COMMUNITY

## 2024-09-20 RX ORDER — ONDANSETRON 8 MG/1
1 TABLET ON THE TONGUE AND ALLOW TO DISSOLVE AS NEEDED TABLET, ORALLY DISINTEGRATING ORAL
Qty: 30 | Refills: 5 | COMMUNITY
Start: 2023-10-27

## 2024-09-20 RX ORDER — ONDANSETRON 2 MG/ML
4 MG INJECTION, SOLUTION INTRAMUSCULAR; INTRAVENOUS
COMMUNITY
Start: 2021-11-19

## 2024-09-20 RX ORDER — IBUPROFEN 800 MG/1
1 TABLET WITH FOOD OR MILK AS NEEDED TABLET, FILM COATED ORAL
COMMUNITY

## 2024-09-20 RX ORDER — HYDROCORTISONE SODIUM SUCCINATE 100 MG/2ML
200 MG INJECTION, POWDER, FOR SOLUTION INTRAMUSCULAR; INTRAVENOUS
COMMUNITY
Start: 2020-10-21

## 2024-09-20 RX ORDER — INFLIXIMAB 100 MG/10ML
AS DIRECTED INJECTION, POWDER, LYOPHILIZED, FOR SOLUTION INTRAVENOUS
COMMUNITY
Start: 2024-03-06

## 2024-09-20 RX ORDER — DIPHENHYDRAMINE HYDROCHLORIDE 50 MG/ML
50 MG INJECTION INTRAMUSCULAR; INTRAVENOUS
COMMUNITY
Start: 2020-10-21

## 2024-09-20 RX ORDER — DIPHENHYDRAMINE HYDROCHLORIDE 50 MG/ML
25 MG INJECTION INTRAMUSCULAR; INTRAVENOUS
Qty: 25 MG | Refills: 11 | COMMUNITY
Start: 2021-12-21

## 2024-10-18 ENCOUNTER — OFFICE VISIT (OUTPATIENT)
Dept: URBAN - METROPOLITAN AREA CLINIC 97 | Facility: CLINIC | Age: 44
End: 2024-10-18
Payer: COMMERCIAL

## 2024-10-18 VITALS
BODY MASS INDEX: 30.29 KG/M2 | WEIGHT: 193 LBS | TEMPERATURE: 98.1 F | HEIGHT: 67 IN | DIASTOLIC BLOOD PRESSURE: 81 MMHG | HEART RATE: 99 BPM | RESPIRATION RATE: 18 BRPM | SYSTOLIC BLOOD PRESSURE: 125 MMHG

## 2024-10-18 DIAGNOSIS — K51.919 ULCERATIVE COLITIS WITH COMPLICATION, UNSPECIFIED LOCATION: ICD-10-CM

## 2024-10-18 PROCEDURE — 96415 CHEMO IV INFUSION ADDL HR: CPT | Performed by: INTERNAL MEDICINE

## 2024-10-18 PROCEDURE — 96413 CHEMO IV INFUSION 1 HR: CPT | Performed by: INTERNAL MEDICINE

## 2024-10-18 PROCEDURE — 96375 TX/PRO/DX INJ NEW DRUG ADDON: CPT | Performed by: INTERNAL MEDICINE

## 2024-10-18 RX ORDER — DIPHENHYDRAMINE HYDROCHLORIDE 50 MG/ML
25 MG INJECTION INTRAMUSCULAR; INTRAVENOUS
Qty: 25 MG | Refills: 11 | COMMUNITY
Start: 2021-12-21

## 2024-10-18 RX ORDER — INFLIXIMAB 100 MG/10ML
AS DIRECTED INJECTION, POWDER, LYOPHILIZED, FOR SOLUTION INTRAVENOUS
COMMUNITY
Start: 2024-03-06

## 2024-10-18 RX ORDER — DIPHENHYDRAMINE HYDROCHLORIDE 50 MG/ML
50 MG INJECTION INTRAMUSCULAR; INTRAVENOUS
COMMUNITY
Start: 2020-10-21

## 2024-10-18 RX ORDER — HYDROCORTISONE SODIUM SUCCINATE 100 MG/2ML
200 MG INJECTION, POWDER, FOR SOLUTION INTRAMUSCULAR; INTRAVENOUS
COMMUNITY
Start: 2020-10-21

## 2024-10-18 RX ORDER — LINACLOTIDE 72 UG/1
1 CAPSULE AT LEAST 30 MINUTES BEFORE THE FIRST MEAL OF THE DAY ON AN EMPTY STOMACH CAPSULE, GELATIN COATED ORAL ONCE A DAY
Qty: 90 | Refills: 3 | COMMUNITY

## 2024-10-18 RX ORDER — ONDANSETRON 2 MG/ML
4 MG INJECTION, SOLUTION INTRAMUSCULAR; INTRAVENOUS
COMMUNITY
Start: 2021-11-19

## 2024-10-18 RX ORDER — ONDANSETRON 8 MG/1
1 TABLET ON THE TONGUE AND ALLOW TO DISSOLVE AS NEEDED TABLET, ORALLY DISINTEGRATING ORAL
Qty: 30 | Refills: 5 | COMMUNITY
Start: 2023-10-27

## 2024-10-18 RX ORDER — IBUPROFEN 800 MG/1
1 TABLET WITH FOOD OR MILK AS NEEDED TABLET, FILM COATED ORAL
COMMUNITY

## 2024-11-15 ENCOUNTER — OFFICE VISIT (OUTPATIENT)
Dept: URBAN - METROPOLITAN AREA CLINIC 97 | Facility: CLINIC | Age: 44
End: 2024-11-15
Payer: COMMERCIAL

## 2024-11-15 VITALS
HEIGHT: 67 IN | TEMPERATURE: 97.8 F | WEIGHT: 195 LBS | BODY MASS INDEX: 30.61 KG/M2 | RESPIRATION RATE: 16 BRPM | DIASTOLIC BLOOD PRESSURE: 85 MMHG | SYSTOLIC BLOOD PRESSURE: 135 MMHG

## 2024-11-15 DIAGNOSIS — K51.90 CHRONIC ULCERATIVE COLITIS: ICD-10-CM

## 2024-11-15 PROCEDURE — 96415 CHEMO IV INFUSION ADDL HR: CPT | Performed by: INTERNAL MEDICINE

## 2024-11-15 PROCEDURE — 96413 CHEMO IV INFUSION 1 HR: CPT | Performed by: INTERNAL MEDICINE

## 2024-11-15 PROCEDURE — 96375 TX/PRO/DX INJ NEW DRUG ADDON: CPT | Performed by: INTERNAL MEDICINE

## 2024-11-15 RX ORDER — INFLIXIMAB 100 MG/10ML
AS DIRECTED INJECTION, POWDER, LYOPHILIZED, FOR SOLUTION INTRAVENOUS
COMMUNITY
Start: 2024-03-06

## 2024-11-15 RX ORDER — HYDROCORTISONE SODIUM SUCCINATE 100 MG/2ML
200 MG INJECTION, POWDER, FOR SOLUTION INTRAMUSCULAR; INTRAVENOUS
COMMUNITY
Start: 2020-10-21

## 2024-11-15 RX ORDER — ONDANSETRON 8 MG/1
1 TABLET ON THE TONGUE AND ALLOW TO DISSOLVE AS NEEDED TABLET, ORALLY DISINTEGRATING ORAL
Qty: 30 | Refills: 5 | COMMUNITY
Start: 2023-10-27

## 2024-11-15 RX ORDER — DIPHENHYDRAMINE HYDROCHLORIDE 50 MG/ML
25 MG INJECTION INTRAMUSCULAR; INTRAVENOUS
Qty: 25 MG | Refills: 11 | COMMUNITY
Start: 2021-12-21

## 2024-11-15 RX ORDER — ONDANSETRON 2 MG/ML
4 MG INJECTION, SOLUTION INTRAMUSCULAR; INTRAVENOUS
COMMUNITY
Start: 2021-11-19

## 2024-11-15 RX ORDER — DIPHENHYDRAMINE HYDROCHLORIDE 50 MG/ML
50 MG INJECTION INTRAMUSCULAR; INTRAVENOUS
COMMUNITY
Start: 2020-10-21

## 2024-11-15 RX ORDER — LINACLOTIDE 72 UG/1
1 CAPSULE AT LEAST 30 MINUTES BEFORE THE FIRST MEAL OF THE DAY ON AN EMPTY STOMACH CAPSULE, GELATIN COATED ORAL ONCE A DAY
Qty: 90 | Refills: 3 | COMMUNITY

## 2024-11-15 RX ORDER — IBUPROFEN 800 MG/1
1 TABLET WITH FOOD OR MILK AS NEEDED TABLET, FILM COATED ORAL
COMMUNITY

## 2024-12-13 ENCOUNTER — OFFICE VISIT (OUTPATIENT)
Dept: URBAN - METROPOLITAN AREA CLINIC 97 | Facility: CLINIC | Age: 44
End: 2024-12-13
Payer: COMMERCIAL

## 2024-12-13 VITALS
HEART RATE: 102 BPM | WEIGHT: 191 LBS | HEIGHT: 67 IN | SYSTOLIC BLOOD PRESSURE: 120 MMHG | RESPIRATION RATE: 18 BRPM | TEMPERATURE: 98.4 F | BODY MASS INDEX: 29.98 KG/M2 | DIASTOLIC BLOOD PRESSURE: 85 MMHG

## 2024-12-13 DIAGNOSIS — K51.018 ULCERATIVE PANCOLITIS WITH OTHER COMPLICATION: ICD-10-CM

## 2024-12-13 PROCEDURE — 96415 CHEMO IV INFUSION ADDL HR: CPT | Performed by: INTERNAL MEDICINE

## 2024-12-13 PROCEDURE — 96413 CHEMO IV INFUSION 1 HR: CPT | Performed by: INTERNAL MEDICINE

## 2024-12-13 PROCEDURE — 96375 TX/PRO/DX INJ NEW DRUG ADDON: CPT | Performed by: INTERNAL MEDICINE

## 2024-12-13 RX ORDER — ONDANSETRON 2 MG/ML
4 MG INJECTION, SOLUTION INTRAMUSCULAR; INTRAVENOUS
COMMUNITY
Start: 2021-11-19

## 2024-12-13 RX ORDER — DIPHENHYDRAMINE HYDROCHLORIDE 50 MG/ML
50 MG INJECTION INTRAMUSCULAR; INTRAVENOUS
COMMUNITY
Start: 2020-10-21

## 2024-12-13 RX ORDER — LINACLOTIDE 72 UG/1
1 CAPSULE AT LEAST 30 MINUTES BEFORE THE FIRST MEAL OF THE DAY ON AN EMPTY STOMACH CAPSULE, GELATIN COATED ORAL ONCE A DAY
Qty: 90 | Refills: 3 | COMMUNITY

## 2024-12-13 RX ORDER — DIPHENHYDRAMINE HYDROCHLORIDE 50 MG/ML
25 MG INJECTION INTRAMUSCULAR; INTRAVENOUS
Qty: 25 MG | Refills: 11 | COMMUNITY
Start: 2021-12-21

## 2024-12-13 RX ORDER — INFLIXIMAB 100 MG/10ML
AS DIRECTED INJECTION, POWDER, LYOPHILIZED, FOR SOLUTION INTRAVENOUS
COMMUNITY
Start: 2024-03-06

## 2024-12-13 RX ORDER — IBUPROFEN 800 MG/1
1 TABLET WITH FOOD OR MILK AS NEEDED TABLET, FILM COATED ORAL
COMMUNITY

## 2024-12-13 RX ORDER — ONDANSETRON 8 MG/1
1 TABLET ON THE TONGUE AND ALLOW TO DISSOLVE AS NEEDED TABLET, ORALLY DISINTEGRATING ORAL
Qty: 30 | Refills: 5 | COMMUNITY
Start: 2023-10-27

## 2024-12-13 RX ORDER — HYDROCORTISONE SODIUM SUCCINATE 100 MG/2ML
200 MG INJECTION, POWDER, FOR SOLUTION INTRAMUSCULAR; INTRAVENOUS
COMMUNITY
Start: 2020-10-21

## 2025-01-10 ENCOUNTER — OFFICE VISIT (OUTPATIENT)
Dept: URBAN - METROPOLITAN AREA CLINIC 105 | Facility: CLINIC | Age: 45
End: 2025-01-10

## 2025-01-17 ENCOUNTER — OFFICE VISIT (OUTPATIENT)
Dept: URBAN - METROPOLITAN AREA CLINIC 105 | Facility: CLINIC | Age: 45
End: 2025-01-17

## 2025-01-24 ENCOUNTER — OFFICE VISIT (OUTPATIENT)
Dept: URBAN - METROPOLITAN AREA CLINIC 97 | Facility: CLINIC | Age: 45
End: 2025-01-24
Payer: COMMERCIAL

## 2025-01-24 VITALS
HEIGHT: 67 IN | WEIGHT: 198.6 LBS | HEART RATE: 65 BPM | DIASTOLIC BLOOD PRESSURE: 81 MMHG | BODY MASS INDEX: 31.17 KG/M2 | TEMPERATURE: 98 F | SYSTOLIC BLOOD PRESSURE: 131 MMHG | RESPIRATION RATE: 19 BRPM

## 2025-01-24 DIAGNOSIS — K51.90 CHRONIC ULCERATIVE COLITIS: ICD-10-CM

## 2025-01-24 PROCEDURE — 96415 CHEMO IV INFUSION ADDL HR: CPT | Performed by: INTERNAL MEDICINE

## 2025-01-24 PROCEDURE — 96413 CHEMO IV INFUSION 1 HR: CPT | Performed by: INTERNAL MEDICINE

## 2025-01-24 PROCEDURE — 96375 TX/PRO/DX INJ NEW DRUG ADDON: CPT | Performed by: INTERNAL MEDICINE

## 2025-01-24 RX ORDER — HYDROCORTISONE SODIUM SUCCINATE 100 MG/2ML
200 MG INJECTION, POWDER, FOR SOLUTION INTRAMUSCULAR; INTRAVENOUS
COMMUNITY
Start: 2020-10-21

## 2025-01-24 RX ORDER — ONDANSETRON 8 MG/1
1 TABLET ON THE TONGUE AND ALLOW TO DISSOLVE AS NEEDED TABLET, ORALLY DISINTEGRATING ORAL
Qty: 30 | Refills: 5 | COMMUNITY
Start: 2023-10-27

## 2025-01-24 RX ORDER — ONDANSETRON 2 MG/ML
4 MG INJECTION, SOLUTION INTRAMUSCULAR; INTRAVENOUS
COMMUNITY
Start: 2021-11-19

## 2025-01-24 RX ORDER — IBUPROFEN 800 MG/1
1 TABLET WITH FOOD OR MILK AS NEEDED TABLET, FILM COATED ORAL
COMMUNITY

## 2025-01-24 RX ORDER — DIPHENHYDRAMINE HYDROCHLORIDE 50 MG/ML
50 MG INJECTION INTRAMUSCULAR; INTRAVENOUS
COMMUNITY
Start: 2020-10-21

## 2025-01-24 RX ORDER — LINACLOTIDE 72 UG/1
1 CAPSULE AT LEAST 30 MINUTES BEFORE THE FIRST MEAL OF THE DAY ON AN EMPTY STOMACH CAPSULE, GELATIN COATED ORAL ONCE A DAY
Qty: 90 | Refills: 3 | COMMUNITY

## 2025-01-24 RX ORDER — DIPHENHYDRAMINE HYDROCHLORIDE 50 MG/ML
25 MG INJECTION INTRAMUSCULAR; INTRAVENOUS
Qty: 25 MG | Refills: 11 | COMMUNITY
Start: 2021-12-21

## 2025-01-24 RX ORDER — INFLIXIMAB 100 MG/10ML
AS DIRECTED INJECTION, POWDER, LYOPHILIZED, FOR SOLUTION INTRAVENOUS
COMMUNITY
Start: 2024-03-06

## 2025-02-18 ENCOUNTER — TELEPHONE ENCOUNTER (OUTPATIENT)
Dept: URBAN - METROPOLITAN AREA CLINIC 105 | Facility: CLINIC | Age: 45
End: 2025-02-18

## 2025-02-21 ENCOUNTER — OFFICE VISIT (OUTPATIENT)
Dept: URBAN - METROPOLITAN AREA CLINIC 97 | Facility: CLINIC | Age: 45
End: 2025-02-21
Payer: COMMERCIAL

## 2025-02-21 VITALS
SYSTOLIC BLOOD PRESSURE: 135 MMHG | HEIGHT: 67 IN | DIASTOLIC BLOOD PRESSURE: 72 MMHG | HEART RATE: 85 BPM | TEMPERATURE: 98.6 F | WEIGHT: 208 LBS | RESPIRATION RATE: 20 BRPM | BODY MASS INDEX: 32.65 KG/M2

## 2025-02-21 DIAGNOSIS — K51.018 ULCERATIVE PANCOLITIS WITH OTHER COMPLICATION: ICD-10-CM

## 2025-02-21 PROCEDURE — 96375 TX/PRO/DX INJ NEW DRUG ADDON: CPT | Performed by: INTERNAL MEDICINE

## 2025-02-21 PROCEDURE — 96413 CHEMO IV INFUSION 1 HR: CPT | Performed by: INTERNAL MEDICINE

## 2025-02-21 PROCEDURE — 96415 CHEMO IV INFUSION ADDL HR: CPT | Performed by: INTERNAL MEDICINE

## 2025-02-21 RX ORDER — LINACLOTIDE 72 UG/1
1 CAPSULE AT LEAST 30 MINUTES BEFORE THE FIRST MEAL OF THE DAY ON AN EMPTY STOMACH CAPSULE, GELATIN COATED ORAL ONCE A DAY
Qty: 90 | Refills: 3 | COMMUNITY

## 2025-02-21 RX ORDER — ONDANSETRON 8 MG/1
1 TABLET ON THE TONGUE AND ALLOW TO DISSOLVE AS NEEDED TABLET, ORALLY DISINTEGRATING ORAL
Qty: 30 | Refills: 5 | COMMUNITY
Start: 2023-10-27

## 2025-02-21 RX ORDER — INFLIXIMAB 100 MG/10ML
AS DIRECTED INJECTION, POWDER, LYOPHILIZED, FOR SOLUTION INTRAVENOUS
COMMUNITY
Start: 2024-03-06

## 2025-02-21 RX ORDER — DIPHENHYDRAMINE HYDROCHLORIDE 50 MG/ML
25 MG INJECTION INTRAMUSCULAR; INTRAVENOUS
Qty: 25 MG | Refills: 11 | COMMUNITY
Start: 2021-12-21

## 2025-02-21 RX ORDER — ONDANSETRON 2 MG/ML
4 MG INJECTION, SOLUTION INTRAMUSCULAR; INTRAVENOUS
COMMUNITY
Start: 2021-11-19

## 2025-02-21 RX ORDER — IBUPROFEN 800 MG/1
1 TABLET WITH FOOD OR MILK AS NEEDED TABLET, FILM COATED ORAL
COMMUNITY

## 2025-02-21 RX ORDER — DIPHENHYDRAMINE HYDROCHLORIDE 50 MG/ML
50 MG INJECTION INTRAMUSCULAR; INTRAVENOUS
COMMUNITY
Start: 2020-10-21

## 2025-02-21 RX ORDER — HYDROCORTISONE SODIUM SUCCINATE 100 MG/2ML
200 MG INJECTION, POWDER, FOR SOLUTION INTRAMUSCULAR; INTRAVENOUS
COMMUNITY
Start: 2020-10-21

## 2025-03-21 ENCOUNTER — OFFICE VISIT (OUTPATIENT)
Dept: URBAN - METROPOLITAN AREA CLINIC 97 | Facility: CLINIC | Age: 45
End: 2025-03-21

## 2025-03-24 ENCOUNTER — OFFICE VISIT (OUTPATIENT)
Dept: URBAN - METROPOLITAN AREA CLINIC 97 | Facility: CLINIC | Age: 45
End: 2025-03-24
Payer: COMMERCIAL

## 2025-03-24 ENCOUNTER — TELEPHONE ENCOUNTER (OUTPATIENT)
Dept: URBAN - METROPOLITAN AREA CLINIC 97 | Facility: CLINIC | Age: 45
End: 2025-03-24

## 2025-03-24 DIAGNOSIS — K51.018 ULCERATIVE PANCOLITIS WITH OTHER COMPLICATION: ICD-10-CM

## 2025-03-24 PROCEDURE — 96413 CHEMO IV INFUSION 1 HR: CPT | Performed by: INTERNAL MEDICINE

## 2025-03-24 PROCEDURE — 96415 CHEMO IV INFUSION ADDL HR: CPT | Performed by: INTERNAL MEDICINE

## 2025-03-24 PROCEDURE — 96375 TX/PRO/DX INJ NEW DRUG ADDON: CPT | Performed by: INTERNAL MEDICINE

## 2025-03-24 RX ORDER — DIPHENHYDRAMINE HYDROCHLORIDE 50 MG/ML
50 MG INJECTION INTRAMUSCULAR; INTRAVENOUS
COMMUNITY
Start: 2020-10-21

## 2025-03-24 RX ORDER — HYDROCORTISONE SODIUM SUCCINATE 100 MG/2ML
AS DIRECTED INJECTION, POWDER, FOR SOLUTION INTRAMUSCULAR; INTRAVENOUS
Qty: 100 | Refills: 0 | OUTPATIENT
Start: 2025-03-24

## 2025-03-24 RX ORDER — ACETAMINOPHEN 650 MG
2 TABLETS AS NEEDED TABLET, EXTENDED RELEASE ORAL
Qty: 6 | Refills: 0 | OUTPATIENT
Start: 2025-03-24 | End: 2025-03-25

## 2025-03-24 RX ORDER — DIPHENHYDRAMINE HYDROCHLORIDE 50 MG/ML
25 MG INJECTION INTRAMUSCULAR; INTRAVENOUS
Qty: 25 MG | Refills: 11 | COMMUNITY
Start: 2021-12-21

## 2025-03-24 RX ORDER — LINACLOTIDE 72 UG/1
1 CAPSULE AT LEAST 30 MINUTES BEFORE THE FIRST MEAL OF THE DAY ON AN EMPTY STOMACH CAPSULE, GELATIN COATED ORAL ONCE A DAY
Qty: 90 | Refills: 3 | COMMUNITY

## 2025-03-24 RX ORDER — ONDANSETRON 8 MG/1
1 TABLET ON THE TONGUE AND ALLOW TO DISSOLVE AS NEEDED TABLET, ORALLY DISINTEGRATING ORAL
Qty: 30 | Refills: 5 | COMMUNITY
Start: 2023-10-27

## 2025-03-24 RX ORDER — IBUPROFEN 800 MG/1
1 TABLET WITH FOOD OR MILK AS NEEDED TABLET, FILM COATED ORAL
COMMUNITY

## 2025-03-24 RX ORDER — DIPHENHYDRAMINE HYDROCHLORIDE 50 MG/ML
0.5 TO 1 ML AS NEEDED INJECTION INTRAMUSCULAR; INTRAVENOUS
Qty: 4 ML | Refills: 0 | OUTPATIENT
Start: 2025-03-24 | End: 2025-03-25

## 2025-03-24 RX ORDER — ONDANSETRON 2 MG/ML
4 MG INJECTION, SOLUTION INTRAMUSCULAR; INTRAVENOUS
COMMUNITY
Start: 2021-11-19

## 2025-03-24 RX ORDER — HYDROCORTISONE SODIUM SUCCINATE 100 MG/2ML
200 MG INJECTION, POWDER, FOR SOLUTION INTRAMUSCULAR; INTRAVENOUS
COMMUNITY
Start: 2020-10-21

## 2025-03-24 RX ORDER — INFLIXIMAB 100 MG/10ML
AS DIRECTED INJECTION, POWDER, LYOPHILIZED, FOR SOLUTION INTRAVENOUS
COMMUNITY
Start: 2024-03-06

## 2025-04-18 ENCOUNTER — OFFICE VISIT (OUTPATIENT)
Dept: URBAN - METROPOLITAN AREA CLINIC 97 | Facility: CLINIC | Age: 45
End: 2025-04-18
Payer: COMMERCIAL

## 2025-04-18 DIAGNOSIS — K51.018 ULCERATIVE PANCOLITIS WITH OTHER COMPLICATION: ICD-10-CM

## 2025-04-18 PROCEDURE — 96375 TX/PRO/DX INJ NEW DRUG ADDON: CPT | Performed by: INTERNAL MEDICINE

## 2025-04-18 PROCEDURE — 96415 CHEMO IV INFUSION ADDL HR: CPT | Performed by: INTERNAL MEDICINE

## 2025-04-18 PROCEDURE — 96413 CHEMO IV INFUSION 1 HR: CPT | Performed by: INTERNAL MEDICINE

## 2025-04-18 RX ORDER — ONDANSETRON 2 MG/ML
4 MG INJECTION, SOLUTION INTRAMUSCULAR; INTRAVENOUS
COMMUNITY
Start: 2021-11-19

## 2025-04-18 RX ORDER — ONDANSETRON 8 MG/1
1 TABLET ON THE TONGUE AND ALLOW TO DISSOLVE AS NEEDED TABLET, ORALLY DISINTEGRATING ORAL
Qty: 30 | Refills: 5 | COMMUNITY
Start: 2023-10-27

## 2025-04-18 RX ORDER — HYDROCORTISONE SODIUM SUCCINATE 100 MG/2ML
200 MG INJECTION, POWDER, FOR SOLUTION INTRAMUSCULAR; INTRAVENOUS
COMMUNITY
Start: 2020-10-21

## 2025-04-18 RX ORDER — DIPHENHYDRAMINE HYDROCHLORIDE 50 MG/ML
50 MG INJECTION INTRAMUSCULAR; INTRAVENOUS
COMMUNITY
Start: 2020-10-21

## 2025-04-18 RX ORDER — LINACLOTIDE 72 UG/1
1 CAPSULE AT LEAST 30 MINUTES BEFORE THE FIRST MEAL OF THE DAY ON AN EMPTY STOMACH CAPSULE, GELATIN COATED ORAL ONCE A DAY
Qty: 90 | Refills: 3 | COMMUNITY

## 2025-04-18 RX ORDER — HYDROCORTISONE SODIUM SUCCINATE 100 MG/2ML
AS DIRECTED INJECTION, POWDER, FOR SOLUTION INTRAMUSCULAR; INTRAVENOUS
Qty: 100 | Refills: 0 | Status: ACTIVE | COMMUNITY
Start: 2025-03-24

## 2025-04-18 RX ORDER — DIPHENHYDRAMINE HYDROCHLORIDE 50 MG/ML
25 MG INJECTION INTRAMUSCULAR; INTRAVENOUS
Qty: 25 MG | Refills: 11 | COMMUNITY
Start: 2021-12-21

## 2025-04-18 RX ORDER — INFLIXIMAB 100 MG/10ML
AS DIRECTED INJECTION, POWDER, LYOPHILIZED, FOR SOLUTION INTRAVENOUS
COMMUNITY
Start: 2024-03-06

## 2025-04-18 RX ORDER — IBUPROFEN 800 MG/1
1 TABLET WITH FOOD OR MILK AS NEEDED TABLET, FILM COATED ORAL
COMMUNITY

## 2025-05-16 ENCOUNTER — OFFICE VISIT (OUTPATIENT)
Dept: URBAN - METROPOLITAN AREA CLINIC 97 | Facility: CLINIC | Age: 45
End: 2025-05-16
Payer: COMMERCIAL

## 2025-05-16 DIAGNOSIS — K51.018 CHRONIC ULCERATIVE ENTEROCOLITIS WITH OTHER COMPLICATION: ICD-10-CM

## 2025-05-16 PROCEDURE — 96375 TX/PRO/DX INJ NEW DRUG ADDON: CPT | Performed by: INTERNAL MEDICINE

## 2025-05-16 PROCEDURE — 96413 CHEMO IV INFUSION 1 HR: CPT | Performed by: INTERNAL MEDICINE

## 2025-05-16 PROCEDURE — 96415 CHEMO IV INFUSION ADDL HR: CPT | Performed by: INTERNAL MEDICINE

## 2025-05-16 RX ORDER — IBUPROFEN 800 MG/1
1 TABLET WITH FOOD OR MILK AS NEEDED TABLET, FILM COATED ORAL
COMMUNITY

## 2025-05-16 RX ORDER — HYDROCORTISONE SODIUM SUCCINATE 100 MG/2ML
200 MG INJECTION, POWDER, FOR SOLUTION INTRAMUSCULAR; INTRAVENOUS
COMMUNITY
Start: 2020-10-21

## 2025-05-16 RX ORDER — LINACLOTIDE 72 UG/1
1 CAPSULE AT LEAST 30 MINUTES BEFORE THE FIRST MEAL OF THE DAY ON AN EMPTY STOMACH CAPSULE, GELATIN COATED ORAL ONCE A DAY
Qty: 90 | Refills: 3 | COMMUNITY

## 2025-05-16 RX ORDER — HYDROCORTISONE SODIUM SUCCINATE 100 MG/2ML
AS DIRECTED INJECTION, POWDER, FOR SOLUTION INTRAMUSCULAR; INTRAVENOUS
Qty: 100 | Refills: 0 | Status: ACTIVE | COMMUNITY
Start: 2025-03-24

## 2025-05-16 RX ORDER — DIPHENHYDRAMINE HYDROCHLORIDE 50 MG/ML
50 MG INJECTION INTRAMUSCULAR; INTRAVENOUS
COMMUNITY
Start: 2020-10-21

## 2025-05-16 RX ORDER — ONDANSETRON 2 MG/ML
4 MG INJECTION, SOLUTION INTRAMUSCULAR; INTRAVENOUS
COMMUNITY
Start: 2021-11-19

## 2025-05-16 RX ORDER — DIPHENHYDRAMINE HYDROCHLORIDE 50 MG/ML
25 MG INJECTION INTRAMUSCULAR; INTRAVENOUS
Qty: 25 MG | Refills: 11 | COMMUNITY
Start: 2021-12-21

## 2025-05-16 RX ORDER — INFLIXIMAB 100 MG/10ML
AS DIRECTED INJECTION, POWDER, LYOPHILIZED, FOR SOLUTION INTRAVENOUS
COMMUNITY
Start: 2024-03-06

## 2025-05-16 RX ORDER — ONDANSETRON 8 MG/1
1 TABLET ON THE TONGUE AND ALLOW TO DISSOLVE AS NEEDED TABLET, ORALLY DISINTEGRATING ORAL
Qty: 30 | Refills: 5 | COMMUNITY
Start: 2023-10-27

## 2025-06-23 ENCOUNTER — TELEPHONE ENCOUNTER (OUTPATIENT)
Dept: URBAN - METROPOLITAN AREA CLINIC 23 | Facility: CLINIC | Age: 45
End: 2025-06-23

## 2025-06-23 RX ORDER — LINACLOTIDE 72 UG/1
1-2 CAPSULES AT LEAST 30 MINUTES BEFORE THE FIRST MEAL OF THE DAY ON AN EMPTY STOMACH CAPSULE, GELATIN COATED ORAL ONCE A DAY
Qty: 30 | Refills: 2 | OUTPATIENT

## 2025-07-09 ENCOUNTER — OFFICE VISIT (OUTPATIENT)
Dept: URBAN - METROPOLITAN AREA CLINIC 105 | Facility: CLINIC | Age: 45
End: 2025-07-09
Payer: COMMERCIAL

## 2025-07-09 DIAGNOSIS — K59.00 CONSTIPATION, UNSPECIFIED CONSTIPATION TYPE: ICD-10-CM

## 2025-07-09 DIAGNOSIS — Z98.890 HISTORY OF INSERTION OF TUNNELED CENTRAL VENOUS CATHETER (CVC) WITH PORT: ICD-10-CM

## 2025-07-09 DIAGNOSIS — R11.0 NAUSEA: ICD-10-CM

## 2025-07-09 DIAGNOSIS — E55.9 VITAMIN D DEFICIENCY: ICD-10-CM

## 2025-07-09 DIAGNOSIS — K51.919 ULCERATIVE COLITIS WITH COMPLICATION, UNSPECIFIED LOCATION: ICD-10-CM

## 2025-07-09 DIAGNOSIS — R19.7 DIARRHEA, UNSPECIFIED: ICD-10-CM

## 2025-07-09 DIAGNOSIS — D50.9 IRON DEFICIENCY ANEMIA, UNSPECIFIED: ICD-10-CM

## 2025-07-09 PROCEDURE — 99214 OFFICE O/P EST MOD 30 MIN: CPT | Performed by: INTERNAL MEDICINE

## 2025-07-09 RX ORDER — INFLIXIMAB 100 MG/10ML
AS DIRECTED INJECTION, POWDER, LYOPHILIZED, FOR SOLUTION INTRAVENOUS
Status: ACTIVE | COMMUNITY
Start: 2024-03-06

## 2025-07-09 RX ORDER — HYDROCORTISONE SODIUM SUCCINATE 100 MG/2ML
AS DIRECTED INJECTION, POWDER, FOR SOLUTION INTRAMUSCULAR; INTRAVENOUS
Qty: 100 | Refills: 0 | Status: ACTIVE | COMMUNITY
Start: 2025-03-24

## 2025-07-09 RX ORDER — ONDANSETRON 2 MG/ML
4 MG INJECTION, SOLUTION INTRAMUSCULAR; INTRAVENOUS
Status: ACTIVE | COMMUNITY
Start: 2021-11-19

## 2025-07-09 RX ORDER — DIPHENHYDRAMINE HYDROCHLORIDE 50 MG/ML
25 MG INJECTION INTRAMUSCULAR; INTRAVENOUS
Qty: 25 MG | Refills: 11 | Status: ACTIVE | COMMUNITY
Start: 2021-12-21

## 2025-07-09 RX ORDER — LINACLOTIDE 72 UG/1
1-2 CAPSULES AT LEAST 30 MINUTES BEFORE THE FIRST MEAL OF THE DAY ON AN EMPTY STOMACH CAPSULE, GELATIN COATED ORAL ONCE A DAY
Qty: 30 | Refills: 2 | Status: ON HOLD | COMMUNITY

## 2025-07-09 RX ORDER — ONDANSETRON 8 MG/1
1 TABLET ON THE TONGUE AND ALLOW TO DISSOLVE AS NEEDED TABLET, ORALLY DISINTEGRATING ORAL
Qty: 30 | Refills: 5 | Status: ACTIVE | COMMUNITY
Start: 2023-10-27

## 2025-07-09 RX ORDER — HYDROCORTISONE SODIUM SUCCINATE 100 MG/2ML
200 MG INJECTION, POWDER, FOR SOLUTION INTRAMUSCULAR; INTRAVENOUS
Status: ACTIVE | COMMUNITY
Start: 2020-10-21

## 2025-07-09 RX ORDER — IBUPROFEN 800 MG/1
1 TABLET WITH FOOD OR MILK AS NEEDED TABLET, FILM COATED ORAL
Status: ACTIVE | COMMUNITY

## 2025-07-09 RX ORDER — PROMETHAZINE HYDROCHLORIDE 12.5 MG/1
1-2 TABLETS TABLET ORAL
Qty: 90 | Refills: 1 | OUTPATIENT
Start: 2025-07-09
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
## 2025-07-16 LAB
A/G RATIO: 1.6
ABSOLUTE BASOPHILS: 22
ABSOLUTE EOSINOPHILS: 117
ABSOLUTE LYMPHOCYTES: 2234
ABSOLUTE MONOCYTES: 453
ABSOLUTE NEUTROPHILS: 4475
ALBUMIN: 4.1
ALKALINE PHOSPHATASE: 66
ALT (SGPT): 33
AST (SGOT): 31
BASOPHILS: 0.3
BILIRUBIN, TOTAL: 0.5
BUN/CREATININE RATIO: (no result)
BUN: 15
CALCIUM: 9.3
CARBON DIOXIDE, TOTAL: 30
CHLORIDE: 102
COMMENT: (no result)
CREATININE: 0.89
EGFR: 82
EOSINOPHILS: 1.6
FERRITIN, SERUM: 34
GLOBULIN, TOTAL: 2.6
GLUCOSE: 85
HEMATOCRIT: 29.4
HEMOGLOBIN: 9.5
INFLIXIMAB AB, IBD: <10
INFLIXIMAB LEVEL, IBD: 41.7
INTERPRETATION: (no result)
IRON BIND.CAP.(TIBC): 368
IRON SATURATION: 24
IRON: 89
LYMPHOCYTES: 30.6
MCH: 30
MCHC: 32.3
MCV: 92.7
MONOCYTES: 6.2
MPV: 10
NEUTROPHILS: 61.3
PLATELET COUNT: 280
POTASSIUM: 3.7
PROTEIN, TOTAL: 6.7
RDW: 11.7
RED BLOOD CELL COUNT: 3.17
SODIUM: 141
TSH W/REFLEX TO FT4: 1.41
VITAMIN D,25-OH,TOTAL,IA: 25
WHITE BLOOD CELL COUNT: 7.3

## 2025-07-23 ENCOUNTER — TELEPHONE ENCOUNTER (OUTPATIENT)
Dept: URBAN - METROPOLITAN AREA CLINIC 23 | Facility: CLINIC | Age: 45
End: 2025-07-23

## 2025-07-23 ENCOUNTER — OFFICE VISIT (OUTPATIENT)
Dept: URBAN - METROPOLITAN AREA CLINIC 97 | Facility: CLINIC | Age: 45
End: 2025-07-23
Payer: COMMERCIAL

## 2025-07-23 DIAGNOSIS — K51.919 ULCERATIVE COLITIS WITH COMPLICATION, UNSPECIFIED LOCATION: ICD-10-CM

## 2025-07-23 PROCEDURE — 96375 TX/PRO/DX INJ NEW DRUG ADDON: CPT | Performed by: INTERNAL MEDICINE

## 2025-07-23 PROCEDURE — 96415 CHEMO IV INFUSION ADDL HR: CPT | Performed by: INTERNAL MEDICINE

## 2025-07-23 PROCEDURE — 96413 CHEMO IV INFUSION 1 HR: CPT | Performed by: INTERNAL MEDICINE

## 2025-07-23 RX ORDER — HYDROCORTISONE SODIUM SUCCINATE 100 MG/2ML
AS DIRECTED INJECTION, POWDER, FOR SOLUTION INTRAMUSCULAR; INTRAVENOUS
Qty: 100 | Refills: 0 | Status: ACTIVE | COMMUNITY
Start: 2025-03-24

## 2025-07-23 RX ORDER — DIPHENHYDRAMINE HYDROCHLORIDE 50 MG/ML
25 MG INJECTION INTRAMUSCULAR; INTRAVENOUS
Qty: 25 MG | Refills: 11 | Status: ACTIVE | COMMUNITY
Start: 2021-12-21

## 2025-07-23 RX ORDER — ONDANSETRON 8 MG/1
1 TABLET ON THE TONGUE AND ALLOW TO DISSOLVE AS NEEDED TABLET, ORALLY DISINTEGRATING ORAL
Qty: 30 | Refills: 5 | Status: ACTIVE | COMMUNITY
Start: 2023-10-27

## 2025-07-23 RX ORDER — IBUPROFEN 800 MG/1
1 TABLET WITH FOOD OR MILK AS NEEDED TABLET, FILM COATED ORAL
Status: ACTIVE | COMMUNITY

## 2025-07-23 RX ORDER — PROMETHAZINE HYDROCHLORIDE 12.5 MG/1
1-2 TABLETS TABLET ORAL
Qty: 90 | Refills: 1 | Status: ACTIVE | COMMUNITY
Start: 2025-07-09

## 2025-07-23 RX ORDER — INFLIXIMAB 100 MG/10ML
AS DIRECTED INJECTION, POWDER, LYOPHILIZED, FOR SOLUTION INTRAVENOUS
Status: ACTIVE | COMMUNITY
Start: 2024-03-06

## 2025-07-23 RX ORDER — LINACLOTIDE 72 UG/1
1-2 CAPSULES AT LEAST 30 MINUTES BEFORE THE FIRST MEAL OF THE DAY ON AN EMPTY STOMACH CAPSULE, GELATIN COATED ORAL ONCE A DAY
Qty: 30 | Refills: 2 | Status: ON HOLD | COMMUNITY

## 2025-07-23 RX ORDER — ONDANSETRON 2 MG/ML
4 MG INJECTION, SOLUTION INTRAMUSCULAR; INTRAVENOUS
Status: ACTIVE | COMMUNITY
Start: 2021-11-19

## 2025-07-23 RX ORDER — HYDROCORTISONE SODIUM SUCCINATE 100 MG/2ML
200 MG INJECTION, POWDER, FOR SOLUTION INTRAMUSCULAR; INTRAVENOUS
Status: ACTIVE | COMMUNITY
Start: 2020-10-21

## 2025-08-01 ENCOUNTER — TELEPHONE ENCOUNTER (OUTPATIENT)
Dept: URBAN - METROPOLITAN AREA CLINIC 105 | Facility: CLINIC | Age: 45
End: 2025-08-01

## 2025-08-07 ENCOUNTER — TELEPHONE ENCOUNTER (OUTPATIENT)
Dept: URBAN - METROPOLITAN AREA CLINIC 97 | Facility: CLINIC | Age: 45
End: 2025-08-07

## 2025-08-08 ENCOUNTER — OFFICE VISIT (OUTPATIENT)
Dept: URBAN - METROPOLITAN AREA CLINIC 105 | Facility: CLINIC | Age: 45
End: 2025-08-08

## 2025-08-08 RX ORDER — INFLIXIMAB 100 MG/10ML
AS DIRECTED INJECTION, POWDER, LYOPHILIZED, FOR SOLUTION INTRAVENOUS
COMMUNITY
Start: 2024-03-06

## 2025-08-08 RX ORDER — ONDANSETRON 8 MG/1
1 TABLET ON THE TONGUE AND ALLOW TO DISSOLVE AS NEEDED TABLET, ORALLY DISINTEGRATING ORAL
Qty: 30 | Refills: 5 | COMMUNITY
Start: 2023-10-27

## 2025-08-08 RX ORDER — HYDROCORTISONE SODIUM SUCCINATE 100 MG/2ML
AS DIRECTED INJECTION, POWDER, FOR SOLUTION INTRAMUSCULAR; INTRAVENOUS
Qty: 100 | Refills: 0 | COMMUNITY
Start: 2025-03-24

## 2025-08-08 RX ORDER — DIPHENHYDRAMINE HYDROCHLORIDE 50 MG/ML
25 MG INJECTION INTRAMUSCULAR; INTRAVENOUS
Qty: 25 MG | Refills: 11 | COMMUNITY
Start: 2021-12-21

## 2025-08-08 RX ORDER — IBUPROFEN 800 MG/1
1 TABLET WITH FOOD OR MILK AS NEEDED TABLET, FILM COATED ORAL
COMMUNITY

## 2025-08-08 RX ORDER — HYDROCORTISONE SODIUM SUCCINATE 100 MG/2ML
200 MG INJECTION, POWDER, FOR SOLUTION INTRAMUSCULAR; INTRAVENOUS
COMMUNITY
Start: 2020-10-21

## 2025-08-08 RX ORDER — ONDANSETRON 2 MG/ML
4 MG INJECTION, SOLUTION INTRAMUSCULAR; INTRAVENOUS
COMMUNITY
Start: 2021-11-19

## 2025-08-08 RX ORDER — PROMETHAZINE HYDROCHLORIDE 12.5 MG/1
1-2 TABLETS TABLET ORAL
Qty: 90 | Refills: 1 | COMMUNITY
Start: 2025-07-09

## 2025-08-08 RX ORDER — LINACLOTIDE 72 UG/1
1-2 CAPSULES AT LEAST 30 MINUTES BEFORE THE FIRST MEAL OF THE DAY ON AN EMPTY STOMACH CAPSULE, GELATIN COATED ORAL ONCE A DAY
Qty: 30 | Refills: 2 | Status: ON HOLD | COMMUNITY

## 2025-08-21 ENCOUNTER — OFFICE VISIT (OUTPATIENT)
Dept: URBAN - METROPOLITAN AREA CLINIC 105 | Facility: CLINIC | Age: 45
End: 2025-08-21
Payer: COMMERCIAL

## 2025-08-21 ENCOUNTER — TELEPHONE ENCOUNTER (OUTPATIENT)
Dept: URBAN - METROPOLITAN AREA CLINIC 105 | Facility: CLINIC | Age: 45
End: 2025-08-21

## 2025-08-21 DIAGNOSIS — K59.00 CONSTIPATION, UNSPECIFIED CONSTIPATION TYPE: ICD-10-CM

## 2025-08-21 DIAGNOSIS — Z98.890 HISTORY OF INSERTION OF TUNNELED CENTRAL VENOUS CATHETER (CVC) WITH PORT: ICD-10-CM

## 2025-08-21 DIAGNOSIS — D50.9 IRON DEFICIENCY ANEMIA, UNSPECIFIED: ICD-10-CM

## 2025-08-21 DIAGNOSIS — R19.7 DIARRHEA, UNSPECIFIED: ICD-10-CM

## 2025-08-21 DIAGNOSIS — E55.9 VITAMIN D DEFICIENCY: ICD-10-CM

## 2025-08-21 DIAGNOSIS — R53.83 OTHER FATIGUE: ICD-10-CM

## 2025-08-21 DIAGNOSIS — R11.0 NAUSEA: ICD-10-CM

## 2025-08-21 DIAGNOSIS — K51.919 ULCERATIVE COLITIS WITH COMPLICATION, UNSPECIFIED LOCATION: ICD-10-CM

## 2025-08-21 PROCEDURE — 99214 OFFICE O/P EST MOD 30 MIN: CPT

## 2025-08-21 RX ORDER — DIPHENHYDRAMINE HYDROCHLORIDE 50 MG/ML
25 MG INJECTION INTRAMUSCULAR; INTRAVENOUS
Qty: 25 MG | Refills: 11 | Status: ACTIVE | COMMUNITY
Start: 2021-12-21

## 2025-08-21 RX ORDER — PROMETHAZINE HYDROCHLORIDE 12.5 MG/1
1-2 TABLETS TABLET ORAL
Qty: 90 | Refills: 1 | Status: ACTIVE | COMMUNITY
Start: 2025-07-09

## 2025-08-21 RX ORDER — HYDROCORTISONE SODIUM SUCCINATE 100 MG/2ML
AS DIRECTED INJECTION, POWDER, FOR SOLUTION INTRAMUSCULAR; INTRAVENOUS
Qty: 100 | Refills: 0 | Status: ACTIVE | COMMUNITY
Start: 2025-03-24

## 2025-08-21 RX ORDER — HYDROCORTISONE SODIUM SUCCINATE 100 MG/2ML
200 MG INJECTION, POWDER, FOR SOLUTION INTRAMUSCULAR; INTRAVENOUS
Status: ACTIVE | COMMUNITY
Start: 2020-10-21

## 2025-08-21 RX ORDER — LINACLOTIDE 72 UG/1
1-2 CAPSULES AT LEAST 30 MINUTES BEFORE THE FIRST MEAL OF THE DAY ON AN EMPTY STOMACH CAPSULE, GELATIN COATED ORAL ONCE A DAY
Qty: 30 | Refills: 2 | Status: ACTIVE | COMMUNITY

## 2025-08-21 RX ORDER — IBUPROFEN 800 MG/1
1 TABLET WITH FOOD OR MILK AS NEEDED TABLET, FILM COATED ORAL
Status: ACTIVE | COMMUNITY

## 2025-08-21 RX ORDER — PROMETHAZINE HYDROCHLORIDE 6.25 MG/5ML
AS DIRECTED SYRUP ORAL
Status: ACTIVE | COMMUNITY

## 2025-08-22 LAB
A/G RATIO: 1.6
ABSOLUTE BASOPHILS: 26
ABSOLUTE EOSINOPHILS: 77
ABSOLUTE LYMPHOCYTES: 2786
ABSOLUTE MONOCYTES: 421
ABSOLUTE NEUTROPHILS: 5289
ALBUMIN: 4.3
ALKALINE PHOSPHATASE: 74
ALT (SGPT): 28
AST (SGOT): 22
BASOPHILS: 0.3
BILIRUBIN, TOTAL: 0.5
BUN/CREATININE RATIO: (no result)
BUN: 13
CALCIUM: 9
CARBON DIOXIDE, TOTAL: 27
CHLORIDE: 103
CREATININE: 0.97
EGFR: 74
EOSINOPHILS: 0.9
GLOBULIN, TOTAL: 2.7
GLUCOSE: 81
HEMATOCRIT: 33.5
HEMOGLOBIN: 11
LYMPHOCYTES: 32.4
MCH: 30.6
MCHC: 32.8
MCV: 93.3
MONOCYTES: 4.9
MPV: 10
NEUTROPHILS: 61.5
PLATELET COUNT: 280
POTASSIUM: 3.7
PROTEIN, TOTAL: 7
RDW: 12.3
RED BLOOD CELL COUNT: 3.59
SODIUM: 137
TSH W/REFLEX TO FT4: 1.18
WHITE BLOOD CELL COUNT: 8.6

## 2025-08-26 ENCOUNTER — OFFICE VISIT (OUTPATIENT)
Dept: URBAN - METROPOLITAN AREA CLINIC 105 | Facility: CLINIC | Age: 45
End: 2025-08-26